# Patient Record
Sex: MALE | Race: WHITE | NOT HISPANIC OR LATINO | Employment: FULL TIME | URBAN - METROPOLITAN AREA
[De-identification: names, ages, dates, MRNs, and addresses within clinical notes are randomized per-mention and may not be internally consistent; named-entity substitution may affect disease eponyms.]

---

## 2017-01-31 ENCOUNTER — RECORDS - HEALTHEAST (OUTPATIENT)
Dept: LAB | Facility: CLINIC | Age: 57
End: 2017-01-31

## 2017-01-31 LAB
CHOLEST SERPL-MCNC: 176 MG/DL
FASTING STATUS PATIENT QL REPORTED: YES
HDLC SERPL-MCNC: 36 MG/DL
LDLC SERPL CALC-MCNC: 106 MG/DL
TRIGL SERPL-MCNC: 172 MG/DL

## 2017-07-27 ENCOUNTER — RECORDS - HEALTHEAST (OUTPATIENT)
Dept: LAB | Facility: CLINIC | Age: 57
End: 2017-07-27

## 2017-07-27 LAB
CHOLEST SERPL-MCNC: 167 MG/DL
FASTING STATUS PATIENT QL REPORTED: ABNORMAL
HDLC SERPL-MCNC: 34 MG/DL
LDLC SERPL CALC-MCNC: 89 MG/DL
TRIGL SERPL-MCNC: 218 MG/DL

## 2018-06-11 ENCOUNTER — RECORDS - HEALTHEAST (OUTPATIENT)
Dept: LAB | Facility: CLINIC | Age: 58
End: 2018-06-11

## 2018-06-11 LAB
ANION GAP SERPL CALCULATED.3IONS-SCNC: 9 MMOL/L (ref 5–18)
BUN SERPL-MCNC: 18 MG/DL (ref 8–22)
CALCIUM SERPL-MCNC: 9.4 MG/DL (ref 8.5–10.5)
CHLORIDE BLD-SCNC: 107 MMOL/L (ref 98–107)
CHOLEST SERPL-MCNC: 138 MG/DL
CO2 SERPL-SCNC: 23 MMOL/L (ref 22–31)
CREAT SERPL-MCNC: 1.41 MG/DL (ref 0.7–1.3)
CREAT UR-MCNC: 102.6 MG/DL
FASTING STATUS PATIENT QL REPORTED: ABNORMAL
GFR SERPL CREATININE-BSD FRML MDRD: 52 ML/MIN/1.73M2
GLUCOSE BLD-MCNC: 120 MG/DL (ref 70–125)
HDLC SERPL-MCNC: 33 MG/DL
LDLC SERPL CALC-MCNC: 66 MG/DL
MICROALBUMIN UR-MCNC: 16.31 MG/DL (ref 0–1.99)
MICROALBUMIN/CREAT UR: 159 MG/G
POTASSIUM BLD-SCNC: 4.5 MMOL/L (ref 3.5–5)
SODIUM SERPL-SCNC: 139 MMOL/L (ref 136–145)
TRIGL SERPL-MCNC: 195 MG/DL
URATE SERPL-MCNC: 8 MG/DL (ref 3–8)

## 2019-01-17 ENCOUNTER — RECORDS - HEALTHEAST (OUTPATIENT)
Dept: LAB | Facility: CLINIC | Age: 59
End: 2019-01-17

## 2019-01-17 LAB
ANION GAP SERPL CALCULATED.3IONS-SCNC: 12 MMOL/L (ref 5–18)
BUN SERPL-MCNC: 17 MG/DL (ref 8–22)
CALCIUM SERPL-MCNC: 9.7 MG/DL (ref 8.5–10.5)
CHLORIDE BLD-SCNC: 103 MMOL/L (ref 98–107)
CHOLEST SERPL-MCNC: 148 MG/DL
CO2 SERPL-SCNC: 22 MMOL/L (ref 22–31)
CREAT SERPL-MCNC: 1.4 MG/DL (ref 0.7–1.3)
FASTING STATUS PATIENT QL REPORTED: ABNORMAL
GFR SERPL CREATININE-BSD FRML MDRD: 52 ML/MIN/1.73M2
GLUCOSE BLD-MCNC: 223 MG/DL (ref 70–125)
HDLC SERPL-MCNC: 37 MG/DL
LDLC SERPL CALC-MCNC: 66 MG/DL
POTASSIUM BLD-SCNC: 4.4 MMOL/L (ref 3.5–5)
SODIUM SERPL-SCNC: 137 MMOL/L (ref 136–145)
TRIGL SERPL-MCNC: 223 MG/DL

## 2019-04-18 ENCOUNTER — RECORDS - HEALTHEAST (OUTPATIENT)
Dept: LAB | Facility: CLINIC | Age: 59
End: 2019-04-18

## 2019-04-18 LAB
ALBUMIN SERPL-MCNC: 4.2 G/DL (ref 3.5–5)
ALP SERPL-CCNC: 78 U/L (ref 45–120)
ALT SERPL W P-5'-P-CCNC: 24 U/L (ref 0–45)
ANION GAP SERPL CALCULATED.3IONS-SCNC: 13 MMOL/L (ref 5–18)
AST SERPL W P-5'-P-CCNC: 15 U/L (ref 0–40)
BILIRUB SERPL-MCNC: 0.4 MG/DL (ref 0–1)
BUN SERPL-MCNC: 25 MG/DL (ref 8–22)
CALCIUM SERPL-MCNC: 9.8 MG/DL (ref 8.5–10.5)
CHLORIDE BLD-SCNC: 106 MMOL/L (ref 98–107)
CO2 SERPL-SCNC: 21 MMOL/L (ref 22–31)
CREAT SERPL-MCNC: 1.32 MG/DL (ref 0.7–1.3)
GFR SERPL CREATININE-BSD FRML MDRD: 56 ML/MIN/1.73M2
GLUCOSE BLD-MCNC: 172 MG/DL (ref 70–125)
POTASSIUM BLD-SCNC: 4.8 MMOL/L (ref 3.5–5)
PROT SERPL-MCNC: 6.9 G/DL (ref 6–8)
PSA SERPL-MCNC: 0.6 NG/ML (ref 0–3.5)
SODIUM SERPL-SCNC: 140 MMOL/L (ref 136–145)

## 2019-07-29 ENCOUNTER — RECORDS - HEALTHEAST (OUTPATIENT)
Dept: LAB | Facility: CLINIC | Age: 59
End: 2019-07-29

## 2019-07-29 LAB
ALBUMIN SERPL-MCNC: 4 G/DL (ref 3.5–5)
ALP SERPL-CCNC: 84 U/L (ref 45–120)
ALT SERPL W P-5'-P-CCNC: 31 U/L (ref 0–45)
ANION GAP SERPL CALCULATED.3IONS-SCNC: 11 MMOL/L (ref 5–18)
AST SERPL W P-5'-P-CCNC: 19 U/L (ref 0–40)
BILIRUB SERPL-MCNC: 0.5 MG/DL (ref 0–1)
BUN SERPL-MCNC: 25 MG/DL (ref 8–22)
CALCIUM SERPL-MCNC: 9.7 MG/DL (ref 8.5–10.5)
CHLORIDE BLD-SCNC: 108 MMOL/L (ref 98–107)
CO2 SERPL-SCNC: 20 MMOL/L (ref 22–31)
CREAT SERPL-MCNC: 1.3 MG/DL (ref 0.7–1.3)
CREAT UR-MCNC: 57.3 MG/DL
GFR SERPL CREATININE-BSD FRML MDRD: 57 ML/MIN/1.73M2
GLUCOSE BLD-MCNC: 177 MG/DL (ref 70–125)
MICROALBUMIN UR-MCNC: 3.61 MG/DL (ref 0–1.99)
MICROALBUMIN/CREAT UR: 63 MG/G
POTASSIUM BLD-SCNC: 4.7 MMOL/L (ref 3.5–5)
PROT SERPL-MCNC: 6.8 G/DL (ref 6–8)
SODIUM SERPL-SCNC: 139 MMOL/L (ref 136–145)

## 2019-11-01 ENCOUNTER — RECORDS - HEALTHEAST (OUTPATIENT)
Dept: LAB | Facility: CLINIC | Age: 59
End: 2019-11-01

## 2019-11-01 LAB
ALBUMIN SERPL-MCNC: 3.7 G/DL (ref 3.5–5)
ALP SERPL-CCNC: 92 U/L (ref 45–120)
ALT SERPL W P-5'-P-CCNC: 29 U/L (ref 0–45)
ANION GAP SERPL CALCULATED.3IONS-SCNC: 6 MMOL/L (ref 5–18)
AST SERPL W P-5'-P-CCNC: 16 U/L (ref 0–40)
BILIRUB SERPL-MCNC: 0.4 MG/DL (ref 0–1)
BUN SERPL-MCNC: 18 MG/DL (ref 8–22)
CALCIUM SERPL-MCNC: 9.1 MG/DL (ref 8.5–10.5)
CHLORIDE BLD-SCNC: 107 MMOL/L (ref 98–107)
CHOLEST SERPL-MCNC: 136 MG/DL
CO2 SERPL-SCNC: 26 MMOL/L (ref 22–31)
CREAT SERPL-MCNC: 1.46 MG/DL (ref 0.7–1.3)
FASTING STATUS PATIENT QL REPORTED: ABNORMAL
GFR SERPL CREATININE-BSD FRML MDRD: 49 ML/MIN/1.73M2
GLUCOSE BLD-MCNC: 281 MG/DL (ref 70–125)
HDLC SERPL-MCNC: 34 MG/DL
LDLC SERPL CALC-MCNC: 61 MG/DL
POTASSIUM BLD-SCNC: 4.9 MMOL/L (ref 3.5–5)
PROT SERPL-MCNC: 6.3 G/DL (ref 6–8)
SODIUM SERPL-SCNC: 139 MMOL/L (ref 136–145)
TRIGL SERPL-MCNC: 206 MG/DL

## 2020-02-04 ENCOUNTER — RECORDS - HEALTHEAST (OUTPATIENT)
Dept: LAB | Facility: CLINIC | Age: 60
End: 2020-02-04

## 2020-02-04 LAB
ALBUMIN SERPL-MCNC: 4 G/DL (ref 3.5–5)
ALP SERPL-CCNC: 94 U/L (ref 45–120)
ALT SERPL W P-5'-P-CCNC: 29 U/L (ref 0–45)
ANION GAP SERPL CALCULATED.3IONS-SCNC: 10 MMOL/L (ref 5–18)
AST SERPL W P-5'-P-CCNC: 18 U/L (ref 0–40)
BILIRUB SERPL-MCNC: 0.5 MG/DL (ref 0–1)
BUN SERPL-MCNC: 25 MG/DL (ref 8–22)
CALCIUM SERPL-MCNC: 9.7 MG/DL (ref 8.5–10.5)
CHLORIDE BLD-SCNC: 106 MMOL/L (ref 98–107)
CO2 SERPL-SCNC: 24 MMOL/L (ref 22–31)
CREAT SERPL-MCNC: 1.48 MG/DL (ref 0.7–1.3)
GFR SERPL CREATININE-BSD FRML MDRD: 49 ML/MIN/1.73M2
GLUCOSE BLD-MCNC: 148 MG/DL (ref 70–125)
POTASSIUM BLD-SCNC: 5 MMOL/L (ref 3.5–5)
PROT SERPL-MCNC: 6.8 G/DL (ref 6–8)
SODIUM SERPL-SCNC: 140 MMOL/L (ref 136–145)

## 2020-05-11 ENCOUNTER — RECORDS - HEALTHEAST (OUTPATIENT)
Dept: LAB | Facility: CLINIC | Age: 60
End: 2020-05-11

## 2020-05-11 LAB
ALBUMIN SERPL-MCNC: 3.8 G/DL (ref 3.5–5)
ALP SERPL-CCNC: 102 U/L (ref 45–120)
ALT SERPL W P-5'-P-CCNC: 43 U/L (ref 0–45)
ANION GAP SERPL CALCULATED.3IONS-SCNC: 10 MMOL/L (ref 5–18)
AST SERPL W P-5'-P-CCNC: 23 U/L (ref 0–40)
BILIRUB SERPL-MCNC: 0.3 MG/DL (ref 0–1)
BUN SERPL-MCNC: 23 MG/DL (ref 8–22)
CALCIUM SERPL-MCNC: 9.8 MG/DL (ref 8.5–10.5)
CHLORIDE BLD-SCNC: 104 MMOL/L (ref 98–107)
CHOLEST SERPL-MCNC: 158 MG/DL
CO2 SERPL-SCNC: 26 MMOL/L (ref 22–31)
CREAT SERPL-MCNC: 1.5 MG/DL (ref 0.7–1.3)
FASTING STATUS PATIENT QL REPORTED: ABNORMAL
GFR SERPL CREATININE-BSD FRML MDRD: 48 ML/MIN/1.73M2
GLUCOSE BLD-MCNC: 301 MG/DL (ref 70–125)
HDLC SERPL-MCNC: 41 MG/DL
LDLC SERPL CALC-MCNC: 66 MG/DL
POTASSIUM BLD-SCNC: 4.8 MMOL/L (ref 3.5–5)
PROT SERPL-MCNC: 6.8 G/DL (ref 6–8)
SODIUM SERPL-SCNC: 140 MMOL/L (ref 136–145)
TRIGL SERPL-MCNC: 255 MG/DL

## 2020-08-13 ENCOUNTER — RECORDS - HEALTHEAST (OUTPATIENT)
Dept: LAB | Facility: CLINIC | Age: 60
End: 2020-08-13

## 2020-08-13 LAB
ALBUMIN SERPL-MCNC: 3.9 G/DL (ref 3.5–5)
ALP SERPL-CCNC: 90 U/L (ref 45–120)
ALT SERPL W P-5'-P-CCNC: 34 U/L (ref 0–45)
ANION GAP SERPL CALCULATED.3IONS-SCNC: 10 MMOL/L (ref 5–18)
AST SERPL W P-5'-P-CCNC: 18 U/L (ref 0–40)
BILIRUB SERPL-MCNC: 0.3 MG/DL (ref 0–1)
BUN SERPL-MCNC: 17 MG/DL (ref 8–22)
CALCIUM SERPL-MCNC: 9.7 MG/DL (ref 8.5–10.5)
CHLORIDE BLD-SCNC: 106 MMOL/L (ref 98–107)
CO2 SERPL-SCNC: 24 MMOL/L (ref 22–31)
CREAT SERPL-MCNC: 1.36 MG/DL (ref 0.7–1.3)
GFR SERPL CREATININE-BSD FRML MDRD: 54 ML/MIN/1.73M2
GLUCOSE BLD-MCNC: 140 MG/DL (ref 70–125)
MAGNESIUM SERPL-MCNC: 2.2 MG/DL (ref 1.8–2.6)
POTASSIUM BLD-SCNC: 4.9 MMOL/L (ref 3.5–5)
PROT SERPL-MCNC: 6.6 G/DL (ref 6–8)
SODIUM SERPL-SCNC: 140 MMOL/L (ref 136–145)

## 2020-11-16 ENCOUNTER — RECORDS - HEALTHEAST (OUTPATIENT)
Dept: LAB | Facility: CLINIC | Age: 60
End: 2020-11-16

## 2020-11-16 LAB
CHOLEST SERPL-MCNC: 129 MG/DL
CREAT UR-MCNC: 62.9 MG/DL
FASTING STATUS PATIENT QL REPORTED: ABNORMAL
HDLC SERPL-MCNC: 35 MG/DL
LDLC SERPL CALC-MCNC: 56 MG/DL
MICROALBUMIN UR-MCNC: 4.48 MG/DL (ref 0–1.99)
MICROALBUMIN/CREAT UR: 71.2 MG/G
TRIGL SERPL-MCNC: 190 MG/DL

## 2021-04-02 ENCOUNTER — RECORDS - HEALTHEAST (OUTPATIENT)
Dept: ADMINISTRATIVE | Facility: OTHER | Age: 61
End: 2021-04-02

## 2021-04-02 ENCOUNTER — RECORDS - HEALTHEAST (OUTPATIENT)
Dept: LAB | Facility: CLINIC | Age: 61
End: 2021-04-02

## 2021-04-02 LAB
ALBUMIN SERPL-MCNC: 4 G/DL (ref 3.5–5)
ALP SERPL-CCNC: 90 U/L (ref 45–120)
ALT SERPL W P-5'-P-CCNC: 34 U/L (ref 0–45)
ANION GAP SERPL CALCULATED.3IONS-SCNC: 11 MMOL/L (ref 5–18)
AST SERPL W P-5'-P-CCNC: 16 U/L (ref 0–40)
BILIRUB SERPL-MCNC: 0.6 MG/DL (ref 0–1)
BUN SERPL-MCNC: 24 MG/DL (ref 8–22)
CALCIUM SERPL-MCNC: 9.1 MG/DL (ref 8.5–10.5)
CHLORIDE BLD-SCNC: 107 MMOL/L (ref 98–107)
CHOLEST SERPL-MCNC: 132 MG/DL
CO2 SERPL-SCNC: 23 MMOL/L (ref 22–31)
CREAT SERPL-MCNC: 1.34 MG/DL (ref 0.7–1.3)
FASTING STATUS PATIENT QL REPORTED: ABNORMAL
GFR SERPL CREATININE-BSD FRML MDRD: 54 ML/MIN/1.73M2
GLUCOSE BLD-MCNC: 126 MG/DL (ref 70–125)
HDLC SERPL-MCNC: 37 MG/DL
LDLC SERPL CALC-MCNC: 68 MG/DL
POTASSIUM BLD-SCNC: 4.6 MMOL/L (ref 3.5–5)
PROT SERPL-MCNC: 6.7 G/DL (ref 6–8)
SODIUM SERPL-SCNC: 141 MMOL/L (ref 136–145)
TRIGL SERPL-MCNC: 137 MG/DL

## 2021-04-05 ENCOUNTER — OFFICE VISIT - HEALTHEAST (OUTPATIENT)
Dept: CARDIOLOGY | Facility: CLINIC | Age: 61
End: 2021-04-05

## 2021-04-05 ENCOUNTER — AMBULATORY - HEALTHEAST (OUTPATIENT)
Dept: CARDIOLOGY | Facility: CLINIC | Age: 61
End: 2021-04-05

## 2021-04-05 ENCOUNTER — SURGERY - HEALTHEAST (OUTPATIENT)
Dept: CARDIOLOGY | Facility: CLINIC | Age: 61
End: 2021-04-05

## 2021-04-05 ENCOUNTER — AMBULATORY - HEALTHEAST (OUTPATIENT)
Dept: LAB | Facility: CLINIC | Age: 61
End: 2021-04-05

## 2021-04-05 DIAGNOSIS — E66.09 OBESITY DUE TO EXCESS CALORIES: ICD-10-CM

## 2021-04-05 DIAGNOSIS — I25.10 CORONARY ARTERY DISEASE DUE TO LIPID RICH PLAQUE: ICD-10-CM

## 2021-04-05 DIAGNOSIS — I25.83 CORONARY ARTERY DISEASE DUE TO LIPID RICH PLAQUE: ICD-10-CM

## 2021-04-05 DIAGNOSIS — G47.33 OSA (OBSTRUCTIVE SLEEP APNEA): ICD-10-CM

## 2021-04-05 DIAGNOSIS — Z72.0 TOBACCO ABUSE DISORDER: ICD-10-CM

## 2021-04-05 DIAGNOSIS — Z01.812 PRE-PROCEDURE LAB EXAM: ICD-10-CM

## 2021-04-05 DIAGNOSIS — E11.9 TYPE 2 DIABETES MELLITUS, WITHOUT LONG-TERM CURRENT USE OF INSULIN (H): ICD-10-CM

## 2021-04-05 DIAGNOSIS — I10 ESSENTIAL HYPERTENSION: ICD-10-CM

## 2021-04-05 ASSESSMENT — MIFFLIN-ST. JEOR: SCORE: 1956.47

## 2021-04-06 LAB
SARS-COV-2 PCR COMMENT: NORMAL
SARS-COV-2 RNA SPEC QL NAA+PROBE: NEGATIVE
SARS-COV-2 VIRUS SPECIMEN SOURCE: NORMAL

## 2021-04-07 ENCOUNTER — COMMUNICATION - HEALTHEAST (OUTPATIENT)
Dept: SCHEDULING | Facility: CLINIC | Age: 61
End: 2021-04-07

## 2021-04-08 ENCOUNTER — SURGERY - HEALTHEAST (OUTPATIENT)
Dept: CARDIOLOGY | Facility: HOSPITAL | Age: 61
End: 2021-04-08

## 2021-04-08 ASSESSMENT — MIFFLIN-ST. JEOR: SCORE: 1947.98

## 2021-04-12 ENCOUNTER — RECORDS - HEALTHEAST (OUTPATIENT)
Dept: LAB | Facility: CLINIC | Age: 61
End: 2021-04-12

## 2021-04-12 LAB
ANION GAP SERPL CALCULATED.3IONS-SCNC: 8 MMOL/L (ref 5–18)
BUN SERPL-MCNC: 33 MG/DL (ref 8–22)
CALCIUM SERPL-MCNC: 9.1 MG/DL (ref 8.5–10.5)
CHLORIDE BLD-SCNC: 108 MMOL/L (ref 98–107)
CO2 SERPL-SCNC: 25 MMOL/L (ref 22–31)
CREAT SERPL-MCNC: 1.53 MG/DL (ref 0.7–1.3)
GFR SERPL CREATININE-BSD FRML MDRD: 47 ML/MIN/1.73M2
GLUCOSE BLD-MCNC: 117 MG/DL (ref 70–125)
POTASSIUM BLD-SCNC: 4.8 MMOL/L (ref 3.5–5)
SODIUM SERPL-SCNC: 141 MMOL/L (ref 136–145)

## 2021-04-20 ENCOUNTER — OFFICE VISIT - HEALTHEAST (OUTPATIENT)
Dept: CARDIOLOGY | Facility: CLINIC | Age: 61
End: 2021-04-20

## 2021-04-20 DIAGNOSIS — E66.811 CLASS 1 OBESITY DUE TO EXCESS CALORIES WITH BODY MASS INDEX (BMI) OF 34.0 TO 34.9 IN ADULT, UNSPECIFIED WHETHER SERIOUS COMORBIDITY PRESENT: ICD-10-CM

## 2021-04-20 DIAGNOSIS — I25.83 CORONARY ARTERY DISEASE DUE TO LIPID RICH PLAQUE: ICD-10-CM

## 2021-04-20 DIAGNOSIS — I25.10 CORONARY ARTERY DISEASE DUE TO LIPID RICH PLAQUE: ICD-10-CM

## 2021-04-20 DIAGNOSIS — E66.09 CLASS 1 OBESITY DUE TO EXCESS CALORIES WITH BODY MASS INDEX (BMI) OF 34.0 TO 34.9 IN ADULT, UNSPECIFIED WHETHER SERIOUS COMORBIDITY PRESENT: ICD-10-CM

## 2021-04-20 DIAGNOSIS — I10 ESSENTIAL HYPERTENSION: ICD-10-CM

## 2021-04-20 DIAGNOSIS — Z72.0 TOBACCO ABUSE DISORDER: ICD-10-CM

## 2021-04-20 ASSESSMENT — MIFFLIN-ST. JEOR: SCORE: 1943.44

## 2021-05-19 ENCOUNTER — OFFICE VISIT - HEALTHEAST (OUTPATIENT)
Dept: CARDIOLOGY | Facility: CLINIC | Age: 61
End: 2021-05-19

## 2021-05-19 DIAGNOSIS — Z72.0 TOBACCO ABUSE DISORDER: ICD-10-CM

## 2021-05-19 DIAGNOSIS — E66.811 CLASS 1 OBESITY DUE TO EXCESS CALORIES WITH BODY MASS INDEX (BMI) OF 34.0 TO 34.9 IN ADULT, UNSPECIFIED WHETHER SERIOUS COMORBIDITY PRESENT: ICD-10-CM

## 2021-05-19 DIAGNOSIS — E78.00 PURE HYPERCHOLESTEROLEMIA: ICD-10-CM

## 2021-05-19 DIAGNOSIS — E66.09 CLASS 1 OBESITY DUE TO EXCESS CALORIES WITH BODY MASS INDEX (BMI) OF 34.0 TO 34.9 IN ADULT, UNSPECIFIED WHETHER SERIOUS COMORBIDITY PRESENT: ICD-10-CM

## 2021-05-19 DIAGNOSIS — I25.83 CORONARY ARTERY DISEASE DUE TO LIPID RICH PLAQUE: ICD-10-CM

## 2021-05-19 DIAGNOSIS — I25.10 CORONARY ARTERY DISEASE DUE TO LIPID RICH PLAQUE: ICD-10-CM

## 2021-05-19 DIAGNOSIS — I10 ESSENTIAL HYPERTENSION: ICD-10-CM

## 2021-05-19 DIAGNOSIS — G47.33 OSA (OBSTRUCTIVE SLEEP APNEA): ICD-10-CM

## 2021-05-19 DIAGNOSIS — E11.9 TYPE 2 DIABETES MELLITUS, WITHOUT LONG-TERM CURRENT USE OF INSULIN (H): ICD-10-CM

## 2021-05-19 ASSESSMENT — MIFFLIN-ST. JEOR: SCORE: 1929.83

## 2021-05-27 VITALS
WEIGHT: 242 LBS | RESPIRATION RATE: 14 BRPM | HEART RATE: 71 BPM | HEIGHT: 71 IN | DIASTOLIC BLOOD PRESSURE: 50 MMHG | SYSTOLIC BLOOD PRESSURE: 98 MMHG | BODY MASS INDEX: 33.88 KG/M2

## 2021-06-05 VITALS
SYSTOLIC BLOOD PRESSURE: 94 MMHG | BODY MASS INDEX: 34.58 KG/M2 | OXYGEN SATURATION: 94 % | RESPIRATION RATE: 16 BRPM | HEIGHT: 71 IN | DIASTOLIC BLOOD PRESSURE: 54 MMHG | HEART RATE: 83 BPM | WEIGHT: 247 LBS

## 2021-06-05 VITALS
BODY MASS INDEX: 34.3 KG/M2 | HEART RATE: 72 BPM | SYSTOLIC BLOOD PRESSURE: 138 MMHG | RESPIRATION RATE: 16 BRPM | DIASTOLIC BLOOD PRESSURE: 60 MMHG | HEIGHT: 71 IN | WEIGHT: 245 LBS

## 2021-06-05 VITALS — BODY MASS INDEX: 34.44 KG/M2 | WEIGHT: 246 LBS | HEIGHT: 71 IN

## 2021-06-08 ENCOUNTER — HOSPITAL ENCOUNTER (OUTPATIENT)
Dept: NUCLEAR MEDICINE | Facility: HOSPITAL | Age: 61
Discharge: HOME | End: 2021-06-08
Attending: INTERNAL MEDICINE
Payer: COMMERCIAL

## 2021-06-08 ENCOUNTER — HOSPITAL ENCOUNTER (OUTPATIENT)
Dept: CARDIOLOGY | Facility: HOSPITAL | Age: 61
Discharge: HOME | End: 2021-06-08
Attending: INTERNAL MEDICINE
Payer: COMMERCIAL

## 2021-06-08 DIAGNOSIS — E78.00 PURE HYPERCHOLESTEROLEMIA: ICD-10-CM

## 2021-06-08 DIAGNOSIS — I25.83 CORONARY ARTERY DISEASE DUE TO LIPID RICH PLAQUE: ICD-10-CM

## 2021-06-08 DIAGNOSIS — I25.10 CORONARY ARTERY DISEASE DUE TO LIPID RICH PLAQUE: ICD-10-CM

## 2021-06-08 LAB
CV STRESS CURRENT BP HE: NORMAL
CV STRESS CURRENT HR HE: 66
CV STRESS CURRENT HR HE: 68
CV STRESS CURRENT HR HE: 69
CV STRESS CURRENT HR HE: 75
CV STRESS CURRENT HR HE: 77
CV STRESS CURRENT HR HE: 78
CV STRESS CURRENT HR HE: 80
CV STRESS CURRENT HR HE: 80
CV STRESS CURRENT HR HE: 82
CV STRESS CURRENT HR HE: 83
CV STRESS CURRENT HR HE: 83
CV STRESS CURRENT HR HE: 85
CV STRESS CURRENT HR HE: 86
CV STRESS CURRENT HR HE: 88
CV STRESS CURRENT HR HE: 89
CV STRESS CURRENT HR HE: 89
CV STRESS CURRENT HR HE: 90
CV STRESS CURRENT HR HE: 90
CV STRESS CURRENT HR HE: 91
CV STRESS CURRENT HR HE: 92
CV STRESS DEVIATION TIME HE: NORMAL
CV STRESS ECHO PERCENT HR HE: NORMAL
CV STRESS EXERCISE STAGE HE: NORMAL
CV STRESS FINAL RESTING BP HE: NORMAL
CV STRESS FINAL RESTING HR HE: 77
CV STRESS MAX HR HE: 92
CV STRESS MAX TREADMILL GRADE HE: 0
CV STRESS MAX TREADMILL SPEED HE: 0
CV STRESS PEAK DIA BP HE: NORMAL
CV STRESS PEAK SYS BP HE: NORMAL
CV STRESS PHASE HE: NORMAL
CV STRESS PROTOCOL HE: NORMAL
CV STRESS RESTING PT POSITION HE: NORMAL
CV STRESS RESTING PT POSITION HE: NORMAL
CV STRESS ST DEVIATION AMOUNT HE: NORMAL
CV STRESS ST DEVIATION ELEVATION HE: NORMAL
CV STRESS ST EVELATION AMOUNT HE: NORMAL
CV STRESS TEST TYPE HE: NORMAL
CV STRESS TOTAL STAGE TIME MIN 1 HE: NORMAL
NUC STRESS EJECTION FRACTION: 58 %
RATE PRESSURE PRODUCT: NORMAL
STRESS ECHO BASELINE DIASTOLIC HE: 70
STRESS ECHO BASELINE HR: 67
STRESS ECHO BASELINE SYSTOLIC BP: 98
STRESS ECHO CALCULATED PERCENT HR: 58 %
STRESS ECHO LAST STRESS DIASTOLIC BP: 81
STRESS ECHO LAST STRESS HR: 85
STRESS ECHO LAST STRESS SYSTOLIC BP: 145
STRESS ECHO TARGET HR: 160

## 2021-06-10 ENCOUNTER — AMBULATORY - HEALTHEAST (OUTPATIENT)
Dept: CARDIOLOGY | Facility: CLINIC | Age: 61
End: 2021-06-10

## 2021-06-10 DIAGNOSIS — I25.83 CORONARY ARTERY DISEASE DUE TO LIPID RICH PLAQUE: ICD-10-CM

## 2021-06-10 DIAGNOSIS — I25.10 CORONARY ARTERY DISEASE DUE TO LIPID RICH PLAQUE: ICD-10-CM

## 2021-06-16 PROBLEM — I25.10 CORONARY ARTERY DISEASE DUE TO LIPID RICH PLAQUE: Status: ACTIVE | Noted: 2021-04-05

## 2021-06-16 PROBLEM — E78.00 PURE HYPERCHOLESTEROLEMIA: Status: ACTIVE | Noted: 2021-05-19

## 2021-06-16 PROBLEM — I10 ESSENTIAL HYPERTENSION: Status: ACTIVE | Noted: 2021-04-05

## 2021-06-16 PROBLEM — E66.09 OBESITY DUE TO EXCESS CALORIES: Status: ACTIVE | Noted: 2021-04-05

## 2021-06-16 PROBLEM — E11.9 TYPE 2 DIABETES MELLITUS, WITHOUT LONG-TERM CURRENT USE OF INSULIN (H): Status: ACTIVE | Noted: 2021-04-05

## 2021-06-16 PROBLEM — Z72.0 TOBACCO ABUSE DISORDER: Status: ACTIVE | Noted: 2021-04-05

## 2021-06-16 PROBLEM — I25.83 CORONARY ARTERY DISEASE DUE TO LIPID RICH PLAQUE: Status: ACTIVE | Noted: 2021-04-05

## 2021-06-16 PROBLEM — G47.33 OSA (OBSTRUCTIVE SLEEP APNEA): Status: ACTIVE | Noted: 2021-04-05

## 2021-06-16 NOTE — PROGRESS NOTES
Rufus Art  [address] [facility] [address]  [phone] (home)     Primary cardiologist: ALBINA   PCP:  Momo Unger MD  Procedure:  Cor angio poss pci  H&P completed by:  4/5/2021  Case MD:  Chana  Admit date and time:  4/8  Case start time: 0730  Ordering MD:  ALBINA  Diagnosis: chest pain  Anticoagulation: None  CPAP: Yes  Bypass Grafts: No  Renal Issues: No  Allergies: NKA  Diabetic?: Yes  Device?: No      Angiogram Teaching    Reason for Visit:  Telephone call to discuss pre-procedure education in preparation for: 4/5/2021    Procedure Prep:  EKG results obtained, dated: on admission  Pertinent test results obtained - Viewable in [facility], dated: Carmen August 8/2017 see [facility] results  Hemogram results obtained: on admission  Basic Metabolic Panel results obtained: on admission  Lipid Profile results obtained: on admission      Pre-procedure instructions  Patient instructed to be NPO after midnight.  Patient instructed to arrange for transportation home following procedure.  Patient instructed to have a responsible adult with them for 24 hours post-procedure.  Post-procedure follow up process.  Conscious sedation discussed.  The patient was sent the pre-procedure letter (If requested) on - given 4/5    Pre-procedure medication instructions  Patient instructed on antiplatelet medication.  Continue medications as scheduled, with a small amount of water on the day of the procedure unless indicated.  Patient instructed to take 325 mg of Aspirin am of procedure: Yes  Other medication: instructed to take 325 mg aspirin, metoprolol, lisinopril, celexa, wellbutrin the morning of the procedure with sips of water. Advised to hold metformin and glipizide the morning of the procedure.     *PATIENTS RECORDS AVAILABLE IN [facility] UNLESS OTHERWISE INDICATED*    *Order set was entered on this date: 4/5/2021    Patient Active Problem List   Diagnosis     Coronary artery disease due to  lipid rich plaque     SARA (obstructive sleep apnea)     Type 2 diabetes mellitus, without long-term current use of insulin (H)     Essential hypertension     Tobacco abuse disorder     Obesity due to excess calories       Current Outpatient Medications   Medication Sig Dispense Refill     acetaminophen (TYLENOL) 500 MG tablet Take 500-1,000 mg by mouth as needed.       aspirin 81 MG EC tablet Take 81 mg by mouth as needed.       buPROPion (WELLBUTRIN XL) 150 MG 24 hr tablet Take 1 tablet by mouth daily.       citalopram (CELEXA) 20 MG tablet Take 30 mg by mouth daily.       dapagliflozin (FARXIGA) 10 mg Tab Take 1 tablet by mouth daily.       dulaglutide (TRULICITY) 1.5 mg/0.5 mL PnIj Inject 1.5 mg under the skin once a week.       gabapentin (NEURONTIN) 300 MG capsule Take 600 mg by mouth 3 (three) times a day.       glipiZIDE (GLUCOTROL) 5 MG tablet Take 5 mg by mouth daily before breakfast.       lisinopriL (PRINIVIL,ZESTRIL) 5 MG tablet Take 5 mg by mouth daily.       LORazepam (ATIVAN) 0.5 MG tablet Take 0.5-1 mg by mouth as needed.       metFORMIN (GLUCOPHAGE) 1000 MG tablet Take 1,000 mg by mouth 2 (two) times a day.       metoprolol tartrate (LOPRESSOR) 25 mg Take 25 mg by mouth 2 (two) times a day.       nitroglycerin (NITROSTAT) 0.4 MG SL tablet see administration instructions.       simvastatin (ZOCOR) 40 MG tablet Take 20 mg by mouth at bedtime.       No current facility-administered medications for this visit.        No Known Allergies    Plan  Pt's wife will be   Patient ready for procedure    WANDA Salvador

## 2021-06-17 NOTE — PATIENT INSTRUCTIONS - HE
Rufus GARY Art,  I enjoyed visiting with you again today.  I am glad to hear you are doing well.  Per our conversation try to stop smoking and let us get a stress test around June.  I will plan on seeing you October.  Kory Nails

## 2021-06-27 ENCOUNTER — HEALTH MAINTENANCE LETTER (OUTPATIENT)
Age: 61
End: 2021-06-27

## 2021-06-30 NOTE — PROGRESS NOTES
Progress Notes by Sherry Bell CNP at 4/20/2021  7:50 AM     Author: Sherry Bell CNP Service: -- Author Type: Nurse Practitioner    Filed: 4/20/2021 10:52 AM Encounter Date: 4/20/2021 Status: Signed    : Sherry Bell CNP (Nurse Practitioner)             Assessment/Recommendations   1.  Coronary artery disease: History of non-STEMI in 2007 with RCA stent. He had increasing chest discomfort with nausea, shortness of breath, and diaphoresis.  PCI on April 8, 2021 with drug-eluting stent to proximal LAD.  Dual antiplatelet therapy is being used with aspirin indefinitely and ticagrelor for 1 year.    Risk factor modification and lifestyle management topics were discussed including managing comorbidities, weight loss, heart healthy diet, exercise and smoking cessation.  He is not interested in cardiac rehab.  We discussed options for an exercise routine at home.  He has a treadmill and stationary bike.  He states that neuropathy limits his activity.  He does have a pool at his home but does not know how to swim.    2.  Dyslipidemia: Rufus Art is on high intensity statin therapy with atorvastatin 80 mg daily.  His LDL was 69.  We discussed a diet low in saturated fat, weight loss, and exercise along with medication for better control of cholesterol.     3.  Hypertension: His blood pressure is 138/60.    4.  Diabetes:  We discussed the importance of tightly controlled blood sugars to minimize risk of worsening coronary artery disease.  Primary care provider to manage diabetes.    5.  Tobacco use: He continues to smoke 8 cigarettes daily and is not interested in quitting.  He is aware of the importance of quitting.    6.  Obesity: He is working on weight loss through diet and exercise.    Eduardo will follow up with Dr. Nails on May 19.       History of Present Illness/Subjective    Rufus Art is seen at [facility] for post coronary intervention follow up.   He has a history of non-STEMI with RCA stent in 2007, obstructive sleep apnea, diabetes, hypertension, tobacco use, obesity, and dyslipidemia.  He had increasing chest discomfort with nausea, shortness of breath, and diaphoresis.  PCI on April 8, 2021 with drug-eluting stent to proximal LAD.  Dual antiplatelet therapy is being used with aspirin indefinitely and ticagrelor for 1 year.      He denies any symptoms since PCI.  He has significant neuropathy which limits his exercise.  He denies lightheadedness, shortness of breath, dyspnea on exertion, chest pain and lower extremity edema.      He smokes 8 cigarettes daily and has no interest in quitting.    Results for orders placed during the hospital encounter of 04/08/21   Cardiac Catheterization [CATH01] 04/08/2021    Narrative   Angina in a morbidly obese diabetic smoker with previous RCA PCI.    Normal left main    Severe proximal LAD disease, stented with a Synergy YANE with good   angiographic results. The first diagonal is a branching vessel with the   more distal branch having a severe proximal stenosis. Due to vessel   overlap and inability to get sufficient camera angles in the mobile cath   lab the size of the vessel distal to the stenosis could not be clearly   seen, but I suspect it is a small branch.    Moderate OM-3 disease.    Mild RCA instent restenosis and moderate PDA disease.    Estimated blood loss was <20 ml.           Physical Examination Review of Systems   Vitals:    04/20/21 0745   BP: 138/60   Pulse: 72   Resp: 16     Body mass index is 34.17 kg/m .  Wt Readings from Last 3 Encounters:   04/20/21 (!) 245 lb (111.1 kg)   04/08/21 (!) 246 lb (111.6 kg)   04/05/21 (!) 247 lb (112 kg)       General Appearance:     Alert, cooperative and in no acute distress.   ENT/Mouth: membranes moist, no oral lesions or bleeding gums.      EYES:  no scleral icterus, normal conjunctivae   Chest/Lungs:   lungs are clear to auscultation, no rales or wheezing,  respirations unlabored   Cardiovascular:   Regular. Normal first and second heart sounds, no edema bilateral lower extremities    Abdomen:  Soft, nontender, nondistended, bowel sounds present   Extremities: no cyanosis or clubbing   Skin:  Neurologic: warm, dry.  mood and affect are appropriate, alert and oriented x3     Puncture Site: Right radial site is soft with no bruising.  Radial pulses and Pedal pulses intact and symmetrical.  CMS intact.        General: WNL  Eyes: WNL  Ears/Nose/Throat: WNL  Lungs: Cough, Shortness of Breath  Heart: WNL  Stomach: WNL  Bladder: WNL  Muscle/Joints: WNL  Skin: WNL  Nervous System: WNL  Mental Health: WNL     Blood: WNL     Medical History  Surgical History Family History Social History   Past Medical History:   Diagnosis Date   ? CAD (coronary artery disease)    ? Chronic kidney disease    ? Diabetes mellitus (H)    ? HTN (hypertension)    ? Hypercholesterolemia    ? Obesity    ? SARA (obstructive sleep apnea)    ? Tobacco abuse     Past Surgical History:   Procedure Laterality Date   ? CORONARY ANGIOPLASTY WITH STENT PLACEMENT  2007   ? CV CORONARY ANGIOGRAM N/A 4/8/2021    Procedure: Coronary Angiogram;  Surgeon: Carolyn Harvey MD;  Location: [facility] Cardiac Cath Lab;  Service: Cardiology   ? CV LEFT HEART CATHETERIZATION WO LEFT VETRICULOGRAM Left 4/8/2021    Procedure: Left Heart Catheterization Without Left Ventriculogram;  Surgeon: Carolyn Harvey MD;  Location: [facility] Cardiac Cath Lab;  Service: Cardiology    No family history on file. Social History     Socioeconomic History   ? Marital status:      Spouse name: Not on file   ? Number of children: Not on file   ? Years of education: Not on file   ? Highest education level: Not on file   Occupational History   ? Not on file   Social Needs   ? Financial resource strain: Not on file   ? Food insecurity     Worry: Not on file     Inability: Not on file   ? Transportation needs     Medical: Not on file      Non-medical: Not on file   Tobacco Use   ? Smoking status: Current Every Day Smoker     Types: Cigarettes   ? Smokeless tobacco: Never Used   Substance and Sexual Activity   ? Alcohol use: Yes     Comment: pt reports rare use   ? Drug use: Never   ? Sexual activity: Yes   Lifestyle   ? Physical activity     Days per week: Not on file     Minutes per session: Not on file   ? Stress: Not on file   Relationships   ? Social connections     Talks on phone: Not on file     Gets together: Not on file     Attends Baptism service: Not on file     Active member of club or organization: Not on file     Attends meetings of clubs or organizations: Not on file     Relationship status: Not on file   ? Intimate partner violence     Fear of current or ex partner: Not on file     Emotionally abused: Not on file     Physically abused: Not on file     Forced sexual activity: Not on file   Other Topics Concern   ? Not on file   Social History Narrative   ? Not on file          Medications  Allergies   Current Outpatient Medications   Medication Sig Dispense Refill   ? acetaminophen (TYLENOL) 500 MG tablet Take 500-1,000 mg by mouth as needed.     ? aspirin 81 MG EC tablet Take 81 mg by mouth as needed.     ? atorvastatin (LIPITOR) 80 MG tablet Take 1 tablet (80 mg total) by mouth daily. 90 tablet 3   ? buPROPion (WELLBUTRIN XL) 150 MG 24 hr tablet Take 1 tablet by mouth daily.     ? citalopram (CELEXA) 20 MG tablet Take 30 mg by mouth daily.     ? dapagliflozin (FARXIGA) 10 mg Tab Take 1 tablet by mouth daily.     ? dulaglutide (TRULICITY) 1.5 mg/0.5 mL PnIj Inject 1.5 mg under the skin once a week.     ? gabapentin (NEURONTIN) 300 MG capsule Take 600 mg by mouth 3 (three) times a day.     ? glipiZIDE (GLUCOTROL) 5 MG tablet Take 5 mg by mouth daily before breakfast.     ? lisinopriL (PRINIVIL,ZESTRIL) 5 MG tablet Take 5 mg by mouth daily.     ? metFORMIN (GLUCOPHAGE) 1000 MG tablet Take 1,000 mg by mouth 2 (two) times a day.     ?  metoprolol tartrate (LOPRESSOR) 25 mg Take 25 mg by mouth 2 (two) times a day.     ? nitroglycerin (NITROSTAT) 0.4 MG SL tablet Place 1 tablet (0.4 mg total) under the tongue every 5 (five) minutes as needed for chest pain. May repeat every 5 minutes for a maximum of 3 doses in 15 minutes. 25 tablet 3   ? ticagrelor (BRILINTA) 90 mg Tab Take 1 tablet (90 mg total) by mouth 2 (two) times a day. Dose to start this evening. 180 tablet 3   ? LORazepam (ATIVAN) 0.5 MG tablet Take 0.5-1 mg by mouth as needed.     ? nitroglycerin (NITROSTAT) 0.4 MG SL tablet see administration instructions.       No current facility-administered medications for this visit.     No Known Allergies      Lab Results    Chemistry/lipid CBC Cardiac Enzymes/BNP/TSH/INR   Lab Results   Component Value Date    CHOL 126 04/08/2021    HDL 35 (L) 04/08/2021    LDLCALC 69 04/08/2021    TRIG 112 04/08/2021    CREATININE 1.53 (H) 04/12/2021    BUN 33 (H) 04/12/2021    K 4.8 04/12/2021     04/12/2021     (H) 04/12/2021    CO2 25 04/12/2021    Lab Results   Component Value Date    WBC 7.9 04/08/2021    HGB 16.3 04/08/2021    HCT 49.9 04/08/2021    MCV 95 04/08/2021     04/08/2021    No results found for: CKTOTAL, CKMB, CKMBINDEX, TROPONINI, BNP, TSH, INR     40 minutes spent on the date of encounter doing chart review, review of test results, patient visit and documentation.

## 2021-06-30 NOTE — PROGRESS NOTES
Progress Notes by Susu Nails MD at 5/19/2021  7:50 AM     Author: Susu Nails MD Service: -- Author Type: Physician    Filed: 5/19/2021  8:17 AM Encounter Date: 5/19/2021 Status: Signed    : Susu Nails MD (Physician)           [facility]  [facility] Follow-up Note    Assessment & Plan        1. Coronary artery disease due to lipid rich plaque-recent angiography due to symptoms April 8, 2021 showed normal left main, proximal LAD 90% lesion that received a drug-coated stent, first diagonal 70% lesion.  Circumflex was normal with third obtuse marginal artery having sequential 60 and 40% lesions, right coronary had a proximal 20% lesion with a distal PDA 50% stenosis.  Having shortness of breath, most likely secondary to ticagrelor, however we will go ahead and check stress nuclear looking for ischemia and if present to further intervention.   2. Essential hypertension-under good control on lisinopril.   3. Type 2 diabetes mellitus, without long-term current use of insulin (H)-on Farxiga, Trulicity, Metformin, glipizide with hemoglobin A1c of 6.6.   4. Tobacco abuse disorder-discussed stopping smoking.   5. SARA (obstructive sleep apnea)-nightly CPAP.   6. Class 1 obesity due to excess calories with body mass index (BMI) of 34.0 to 34.9 in adult, unspecified whether serious comorbidity present-work on weight loss.   7. Pure hypercholesterolemia-cholesterol 126 with LDL of 69 from April which is excellent.     Plan  1.  Weight loss.  2.  Stop smoking.  3.  If shortness of breath persists consider discontinue ticagrelor over to Plavix.  4.  Follow-up with me in October or sooner if shortness of breath worsens or stress test is abnormal in which case we will need repeat angiography.    Subjective  CC: 60-year-old white gentleman here for a post hospital discharge visit.  Since his intervention he does note some shortness of breath at rest most likely secondary to ticagrelor.  He  "does not have any significant chest discomfort, palpitations, PND, orthopnea, syncope, dizziness or peripheral edema.  He is not doing cardiac rehab but working out at home on the elliptical  as well as treadmill.    Medications  Current Outpatient Medications   Medication Sig   ? acetaminophen (TYLENOL) 500 MG tablet Take 500-1,000 mg by mouth as needed.   ? aspirin 81 MG EC tablet Take 81 mg by mouth as needed.   ? atorvastatin (LIPITOR) 80 MG tablet Take 1 tablet (80 mg total) by mouth daily.   ? buPROPion (WELLBUTRIN XL) 150 MG 24 hr tablet Take 1 tablet by mouth daily.   ? citalopram (CELEXA) 20 MG tablet Take 30 mg by mouth daily.   ? dapagliflozin (FARXIGA) 10 mg Tab Take 1 tablet by mouth daily.   ? dulaglutide (TRULICITY) 1.5 mg/0.5 mL PnIj Inject 1.5 mg under the skin once a week.   ? gabapentin (NEURONTIN) 300 MG capsule Take 600 mg by mouth 3 (three) times a day.   ? glipiZIDE (GLUCOTROL) 5 MG tablet Take 5 mg by mouth daily before breakfast.   ? lisinopriL (PRINIVIL,ZESTRIL) 5 MG tablet Take 5 mg by mouth daily.   ? LORazepam (ATIVAN) 0.5 MG tablet Take 0.5-1 mg by mouth as needed.   ? metFORMIN (GLUCOPHAGE) 1000 MG tablet Take 1,000 mg by mouth 2 (two) times a day.   ? metoprolol tartrate (LOPRESSOR) 25 mg Take 25 mg by mouth 2 (two) times a day.   ? nitroglycerin (NITROSTAT) 0.4 MG SL tablet Place 1 tablet (0.4 mg total) under the tongue every 5 (five) minutes as needed for chest pain. May repeat every 5 minutes for a maximum of 3 doses in 15 minutes.   ? ticagrelor (BRILINTA) 90 mg Tab Take 1 tablet (90 mg total) by mouth 2 (two) times a day. Dose to start this evening.       Objective  BP 98/50 (Patient Site: Left Arm, Patient Position: Sitting, Cuff Size: Adult Large)   Pulse 71   Resp 14   Ht 5' 11\" (1.803 m)   Wt (!) 242 lb (109.8 kg)   BMI 33.75 kg/m      General Appearance:    Alert, cooperative, no distress, appears stated age   Head:    Normocephalic, without obvious abnormality, " atraumatic   Throat:   Lips, mucosa, and tongue normal; teeth and gums normal   Neck:   Supple, symmetrical, trachea midline, no adenopathy;        thyroid:  No enlargement/tenderness/nodules; no carotid    bruit or JVD   Back:     Symmetric, no curvature, ROM normal, no CVA tenderness   Lungs:     Clear to auscultation bilaterally, respirations unlabored   Chest wall:    No tenderness or deformity   Heart:    Regular rate and rhythm, S1 and S2 normal, no murmur, rub   or gallop   Abdomen:     Soft, non-tender, bowel sounds active all four quadrants,     no masses, no organomegaly   Extremities:   Normal, atraumatic, no cyanosis or edema   Pulses:   2+ and symmetric all extremities   Skin:   Skin color, texture, turgor normal, no rashes or lesions     Results    Lab Results personally reviewed   Lab Results   Component Value Date    CHOL 126 04/08/2021    CHOL 132 04/02/2021     Lab Results   Component Value Date    HDL 35 (L) 04/08/2021    HDL 37 (L) 04/02/2021     Lab Results   Component Value Date    LDLCALC 69 04/08/2021    LDLCALC 68 04/02/2021     Lab Results   Component Value Date    TRIG 112 04/08/2021    TRIG 137 04/02/2021     Lab Results   Component Value Date    WBC 7.9 04/08/2021    HGB 16.3 04/08/2021    HCT 49.9 04/08/2021     04/08/2021     Lab Results   Component Value Date    CREATININE 1.53 (H) 04/12/2021    BUN 33 (H) 04/12/2021     04/12/2021    K 4.8 04/12/2021    CO2 25 04/12/2021     Review of Systems:   General: WNL  Eyes: WNL  Ears/Nose/Throat: WNL  Lungs: WNL  Heart: Shortness of Breath with activity  Stomach: Diarrhea  Bladder: WNL  Muscle/Joints: WNL  Skin: WNL  Nervous System: WNL  Mental Health: WNL     Blood: WNL

## 2021-06-30 NOTE — PROGRESS NOTES
Progress Notes by Susu Nails MD at 4/5/2021  1:50 PM     Author: Susu Nails MD Service: -- Author Type: Physician    Filed: 4/5/2021  3:00 PM Encounter Date: 4/5/2021 Status: Signed    : Susu Nails MD (Physician)         [facility] Heart Care Office Consult     Assessment:     1. Coronary artery disease due to lipid rich plaque-in 2007 apparently had a non-ST segment elevation MI involving the inferior wall, at that time based on angiogram he had normal left main, LAD with a distal 60% stenosis, normal circumflex with a large first obtuse marginal, and proximal right coronary artery 99% lesion that received a Liberte stent.  He is having episodes of chest discomfort and now jaw discomfort, suspects it is his heart and declined stress testing, so therefore we will pursue coronary angiography and possible intervention.   2. SARA (obstructive sleep apnea)-nightly CPAP which she is compliant with.   3. Type 2 diabetes mellitus, without long-term current use of insulin (H)-hemoglobin A1c down to 6.6 and is doing well.   4. Essential hypertension-of anything a little hypotensive and on minimal dose of lisinopril.   5. Tobacco abuse disorder-back to smoking any needs to discontinue.   6. Obesity due to excess calories-work on weight loss.   7.  Pure hypercholesterolemia-most recent lipids show excellent cholesterol 132 with excellent LDL of 68 on only 20 mg of simvastatin.     Plan:   1.  Stop smoking.  2.  Weight loss.  3.  Coronary angiography and possible intervention.  4.  Consider backing off on dose of lisinopril.  5.  Follow-up with me after angiography, and risk factor modification.    History of Present Illness:   Thank you for asking the [facility] Heart Care team to see Rufus Art a 60 y.o.  male  in consultation  to evaluate chest discomfort.   Patient was in his usual state of health till about 4 months ago when he developed while standing a discomfort in his chest.  He  described as a pencil type stabbing with associated nausea and shortness of breath with some diaphoresis but no radiation.  It went away after about 3 minutes.  Approximately 3 weeks later he had a similar episode that came on while walking back into his office.  He states it was more intense, and he had a near vomiting episode.  That 1 lasted about 5 or 10 minutes.  Since then, he tells me he has an ache in his jaw which comes and goes, this is reminiscent of his first heart attack and thus he is here to discuss further.    Past Medical History:   Coronary artery disease  Non-ST segment elevation inferior wall MI  Pure hypercholesterolemia  Essential hypertension  Obstructive sleep apnea  Obesity  Tobacco abuse  Diabetes type 2  Questionable TIA  Chronic kidney disease    Past history is negative for cancer, tuberculosis,  rheumatic fever, hypertension, cerebrovascular accident, chronic kidney disease, peptic ulcer disease, chronic obstructive pulmonary disease, or thyroid disorder.    Past Surgical History:   No past surgical history on file.    Family History:   Family history negative for coronary artery disease, positive for atrial fibrillation in his father in his 30s and grandmother dying in her 100s from heart failure.    Social History:   He lives at home independently with his wife, reports that he has been smoking cigarettes, less than a pack a day off and on for about 45 years having one-point quit for 20 years. He has never used smokeless tobacco.  He drinks maybe 3 drinks a month, works doing IT Photoshop, he reports that he does not use drugs. The primary care physician is Momo Unger MD    Meds:   Scheduled Meds:  Current Outpatient Medications   Medication Sig Dispense Refill   ? acetaminophen (TYLENOL) 500 MG tablet Take 500-1,000 mg by mouth as needed.     ? aspirin 81 MG EC tablet Take 81 mg by mouth as needed.     ? buPROPion (WELLBUTRIN XL) 150 MG 24 hr tablet Take 1 tablet  "by mouth daily.     ? citalopram (CELEXA) 20 MG tablet Take 30 mg by mouth daily.     ? dapagliflozin (FARXIGA) 10 mg Tab Take 1 tablet by mouth daily.     ? dulaglutide (TRULICITY) 1.5 mg/0.5 mL PnIj Inject 1.5 mg under the skin once a week.     ? gabapentin (NEURONTIN) 300 MG capsule Take 600 mg by mouth 3 (three) times a day.     ? glipiZIDE (GLUCOTROL) 5 MG tablet Take 5 mg by mouth daily before breakfast.     ? lisinopriL (PRINIVIL,ZESTRIL) 5 MG tablet Take 5 mg by mouth daily.     ? LORazepam (ATIVAN) 0.5 MG tablet Take 0.5-1 mg by mouth as needed.     ? metFORMIN (GLUCOPHAGE) 1000 MG tablet Take 1,000 mg by mouth 2 (two) times a day.     ? metoprolol tartrate (LOPRESSOR) 25 mg Take 25 mg by mouth 2 (two) times a day.     ? nitroglycerin (NITROSTAT) 0.4 MG SL tablet see administration instructions.     ? simvastatin (ZOCOR) 40 MG tablet Take 20 mg by mouth at bedtime.       No current facility-administered medications for this visit.        PRN Meds:.    Allergies:   Patient has no known allergies.    Objective:      Physical Exam  (!) 247 lb (112 kg)  5' 11.25\" (1.81 m)  Body mass index is 34.21 kg/m .  BP 94/54 (Patient Site: Left Arm, Patient Position: Sitting, Cuff Size: Adult Regular)   Pulse 83   Resp 16   Ht 5' 11.25\" (1.81 m)   Wt (!) 247 lb (112 kg)   SpO2 94%   BMI 34.21 kg/m      General Appearance:   Alert, cooperative and in no acute distress.   HEENT:  No scleral icterus; the mucous membranes were pink and moist.   Neck: JVP normal. No thyromegaly. No HJR   Chest: The spine was straight. The chest was symmetric.   Lungs:   Respirations unlabored; the lungs are clear to auscultation.   Cardiovascular:   S1 and S2 without murmur, clicks or rubs. Brachial, radial, carotid and posterior tibial pulses are intact and symetrical.  No carotid bruits noted   Abdomen:  No organomegaly, masses, bruits, or tenderness. Bowels sounds are present   Extremities: No cyanosis, clubbing, or edema.   Skin: No " xanthelasma.   Neurologic: Mood and affect are appropriate.         Lab Reviewed Personally by myself  Lab Results   Component Value Date     04/02/2021    K 4.6 04/02/2021     04/02/2021    CO2 23 04/02/2021    BUN 24 (H) 04/02/2021    CREATININE 1.34 (H) 04/02/2021    CALCIUM 9.1 04/02/2021     No results found for: WBC, HGB, HCT, MCV, PLT  Lab Results   Component Value Date    CHOL 132 04/02/2021    TRIG 137 04/02/2021    HDL 37 (L) 04/02/2021     No results found for: BNP    ECG personally reviewed by myself shows not available but apparently sinus rhythm with right bundle branch block pattern         Review of Systems:     Review of Systems:   General: Weight Loss  Eyes: Visual Distubance  Ears/Nose/Throat: WNL  Lungs: Snoring  Heart: Chest Pain, Arm Pain, Shortness of Breath with activity  Stomach: Constipation, Nausea  Bladder: WNL  Muscle/Joints: Joint Pain  Skin: WNL  Nervous System: Loss of Balance  Mental Health: Depression     Blood: WNL

## 2021-07-04 NOTE — PROGRESS NOTES
Progress Notes by Angelique Koo RN at 6/10/2021  9:41 AM     Author: Angelique Koo RN Service: -- Author Type: Registered Nurse    Filed: 6/10/2021  9:52 AM Encounter Date: 6/10/2021 Status: Signed    : Angelique Koo RN (Registered Nurse)       Susu Nails MD Caswell, Mallory J, RN             Thanks my mistake hit 9 not 6 so yes 60 mg bolus then 10 mg a day.   LF    Previous Messages    ----- Message -----   From: Angelique Koo RN   Sent: 6/9/2021   3:36 PM CDT   To: Susu Nails MD     Hi Dr. Nails   Eduardo is still short of breath and is agreeable to switching over to Effient. Just clarifying loading dose of Effient,- below says 90 mg, previously I had thought the loading dose was 60 mg.   Thanks,   Mal         Susu Nails MD Caswell, Mallory J, RN             Let him know stress looks good is he still shortness of breath?   If so need to switch from tiacelgor to effient, 90 load and 10 a day.   LF      Brilinta removed from medication list. Effient ordered- 60 mg loading dose x1 then 10 mg daily. [place] [place]

## 2021-07-05 ENCOUNTER — COMMUNICATION - HEALTHEAST (OUTPATIENT)
Dept: CARDIOLOGY | Facility: CLINIC | Age: 61
End: 2021-07-05

## 2021-07-05 DIAGNOSIS — I25.83 CORONARY ARTERY DISEASE DUE TO LIPID RICH PLAQUE: ICD-10-CM

## 2021-07-05 DIAGNOSIS — I25.10 CORONARY ARTERY DISEASE DUE TO LIPID RICH PLAQUE: ICD-10-CM

## 2021-08-16 ENCOUNTER — LAB REQUISITION (OUTPATIENT)
Dept: LAB | Facility: CLINIC | Age: 61
End: 2021-08-16

## 2021-08-16 DIAGNOSIS — I12.9 HYPERTENSIVE CHRONIC KIDNEY DISEASE WITH STAGE 1 THROUGH STAGE 4 CHRONIC KIDNEY DISEASE, OR UNSPECIFIED CHRONIC KIDNEY DISEASE: ICD-10-CM

## 2021-08-16 LAB
ALBUMIN SERPL-MCNC: 4 G/DL (ref 3.5–5)
ALP SERPL-CCNC: 126 U/L (ref 45–120)
ALT SERPL W P-5'-P-CCNC: 35 U/L (ref 0–45)
ANION GAP SERPL CALCULATED.3IONS-SCNC: 10 MMOL/L (ref 5–18)
AST SERPL W P-5'-P-CCNC: 19 U/L (ref 0–40)
BILIRUB SERPL-MCNC: 0.6 MG/DL (ref 0–1)
BUN SERPL-MCNC: 23 MG/DL (ref 8–22)
CALCIUM SERPL-MCNC: 10.2 MG/DL (ref 8.5–10.5)
CHLORIDE BLD-SCNC: 108 MMOL/L (ref 98–107)
CO2 SERPL-SCNC: 24 MMOL/L (ref 22–31)
CREAT SERPL-MCNC: 1.43 MG/DL (ref 0.7–1.3)
GFR SERPL CREATININE-BSD FRML MDRD: 53 ML/MIN/1.73M2
GLUCOSE BLD-MCNC: 148 MG/DL (ref 70–125)
POTASSIUM BLD-SCNC: 4.9 MMOL/L (ref 3.5–5)
PROT SERPL-MCNC: 6.8 G/DL (ref 6–8)
SODIUM SERPL-SCNC: 142 MMOL/L (ref 136–145)

## 2021-08-16 PROCEDURE — 80053 COMPREHEN METABOLIC PANEL: CPT | Performed by: FAMILY MEDICINE

## 2021-10-16 ENCOUNTER — HEALTH MAINTENANCE LETTER (OUTPATIENT)
Age: 61
End: 2021-10-16

## 2021-12-30 ENCOUNTER — TELEPHONE (OUTPATIENT)
Dept: CARDIOLOGY | Facility: CLINIC | Age: 61
End: 2021-12-30
Payer: COMMERCIAL

## 2021-12-30 DIAGNOSIS — I25.83 CORONARY ARTERY DISEASE DUE TO LIPID RICH PLAQUE: Primary | ICD-10-CM

## 2021-12-30 DIAGNOSIS — I25.10 CORONARY ARTERY DISEASE DUE TO LIPID RICH PLAQUE: Primary | ICD-10-CM

## 2021-12-30 DIAGNOSIS — Z95.5 CORONARY STENT PATENT: ICD-10-CM

## 2021-12-30 RX ORDER — PRASUGREL 10 MG/1
10 TABLET, FILM COATED ORAL DAILY
Qty: 90 TABLET | Refills: 1 | Status: SHIPPED | OUTPATIENT
Start: 2021-12-30 | End: 2024-10-02

## 2021-12-30 NOTE — TELEPHONE ENCOUNTER
Requesting more information.Need a current script in chart to do the PA.  PA team needs daily dosing, dispensing amount and diagnosis to complete PA.

## 2021-12-30 NOTE — TELEPHONE ENCOUNTER
Pa for Prasugrel 10 daily.   Pt had reaction to Brilinta with shortness of Breath. 6/10 note.   [facility] [place] [id number] is asking for different med.

## 2022-01-06 ENCOUNTER — TELEPHONE (OUTPATIENT)
Dept: CARDIOLOGY | Facility: CLINIC | Age: 62
End: 2022-01-06
Payer: COMMERCIAL

## 2022-01-06 NOTE — TELEPHONE ENCOUNTER
No pa needed- this medication is covered by the plan without needing a pa.  The pharmacy received a paid claim on 01/5/22.

## 2022-01-06 NOTE — TELEPHONE ENCOUNTER
Pa for Prasugrel   Plan prefers Brilinta or Plavix. Pt has reaction to Brilinta.   Not sure if this PA has been done.

## 2022-01-28 ENCOUNTER — LAB REQUISITION (OUTPATIENT)
Dept: LAB | Facility: CLINIC | Age: 62
End: 2022-01-28

## 2022-01-28 DIAGNOSIS — I12.9 HYPERTENSIVE CHRONIC KIDNEY DISEASE WITH STAGE 1 THROUGH STAGE 4 CHRONIC KIDNEY DISEASE, OR UNSPECIFIED CHRONIC KIDNEY DISEASE: ICD-10-CM

## 2022-01-28 DIAGNOSIS — E78.5 HYPERLIPIDEMIA, UNSPECIFIED: ICD-10-CM

## 2022-01-28 LAB
ALBUMIN SERPL-MCNC: 3.9 G/DL (ref 3.5–5)
ALP SERPL-CCNC: 107 U/L (ref 45–120)
ALT SERPL W P-5'-P-CCNC: 40 U/L (ref 0–45)
ANION GAP SERPL CALCULATED.3IONS-SCNC: 11 MMOL/L (ref 5–18)
AST SERPL W P-5'-P-CCNC: 21 U/L (ref 0–40)
BILIRUB SERPL-MCNC: 0.6 MG/DL (ref 0–1)
BUN SERPL-MCNC: 21 MG/DL (ref 8–22)
CALCIUM SERPL-MCNC: 9.7 MG/DL (ref 8.5–10.5)
CHLORIDE BLD-SCNC: 106 MMOL/L (ref 98–107)
CHOLEST SERPL-MCNC: 109 MG/DL
CO2 SERPL-SCNC: 22 MMOL/L (ref 22–31)
CREAT SERPL-MCNC: 1.49 MG/DL (ref 0.7–1.3)
FASTING STATUS PATIENT QL REPORTED: ABNORMAL
GFR SERPL CREATININE-BSD FRML MDRD: 53 ML/MIN/1.73M2
GLUCOSE BLD-MCNC: 199 MG/DL (ref 70–125)
HDLC SERPL-MCNC: 32 MG/DL
LDLC SERPL CALC-MCNC: 43 MG/DL
MAGNESIUM SERPL-MCNC: 2 MG/DL (ref 1.8–2.6)
POTASSIUM BLD-SCNC: 4.6 MMOL/L (ref 3.5–5)
PROT SERPL-MCNC: 6.7 G/DL (ref 6–8)
SODIUM SERPL-SCNC: 139 MMOL/L (ref 136–145)
TRIGL SERPL-MCNC: 172 MG/DL

## 2022-01-28 PROCEDURE — 83735 ASSAY OF MAGNESIUM: CPT | Performed by: FAMILY MEDICINE

## 2022-01-28 PROCEDURE — 80053 COMPREHEN METABOLIC PANEL: CPT | Performed by: FAMILY MEDICINE

## 2022-01-28 PROCEDURE — 80061 LIPID PANEL: CPT | Performed by: FAMILY MEDICINE

## 2022-02-05 ENCOUNTER — HEALTH MAINTENANCE LETTER (OUTPATIENT)
Age: 62
End: 2022-02-05

## 2022-04-01 ENCOUNTER — OFFICE VISIT (OUTPATIENT)
Dept: CARDIOLOGY | Facility: CLINIC | Age: 62
End: 2022-04-01
Payer: COMMERCIAL

## 2022-04-01 VITALS
WEIGHT: 256 LBS | SYSTOLIC BLOOD PRESSURE: 104 MMHG | DIASTOLIC BLOOD PRESSURE: 52 MMHG | HEIGHT: 71 IN | HEART RATE: 92 BPM | RESPIRATION RATE: 16 BRPM | BODY MASS INDEX: 35.84 KG/M2

## 2022-04-01 DIAGNOSIS — E11.00 TYPE 2 DIABETES MELLITUS WITH HYPEROSMOLARITY WITHOUT COMA, WITHOUT LONG-TERM CURRENT USE OF INSULIN (H): ICD-10-CM

## 2022-04-01 DIAGNOSIS — I25.10 CORONARY ARTERY DISEASE DUE TO LIPID RICH PLAQUE: Primary | ICD-10-CM

## 2022-04-01 DIAGNOSIS — E78.00 PURE HYPERCHOLESTEROLEMIA: ICD-10-CM

## 2022-04-01 DIAGNOSIS — I10 ESSENTIAL HYPERTENSION: ICD-10-CM

## 2022-04-01 DIAGNOSIS — I25.83 CORONARY ARTERY DISEASE DUE TO LIPID RICH PLAQUE: Primary | ICD-10-CM

## 2022-04-01 DIAGNOSIS — E66.01 CLASS 2 SEVERE OBESITY DUE TO EXCESS CALORIES WITH SERIOUS COMORBIDITY IN ADULT, UNSPECIFIED BMI (H): ICD-10-CM

## 2022-04-01 DIAGNOSIS — G47.33 OSA (OBSTRUCTIVE SLEEP APNEA): ICD-10-CM

## 2022-04-01 DIAGNOSIS — E66.812 CLASS 2 SEVERE OBESITY DUE TO EXCESS CALORIES WITH SERIOUS COMORBIDITY IN ADULT, UNSPECIFIED BMI (H): ICD-10-CM

## 2022-04-01 DIAGNOSIS — Z72.0 TOBACCO ABUSE DISORDER: ICD-10-CM

## 2022-04-01 PROCEDURE — 99214 OFFICE O/P EST MOD 30 MIN: CPT | Performed by: INTERNAL MEDICINE

## 2022-04-01 RX ORDER — DAPAGLIFLOZIN 10 MG/1
1 TABLET, FILM COATED ORAL DAILY
COMMUNITY
Start: 2020-11-16 | End: 2024-10-02

## 2022-04-01 RX ORDER — GABAPENTIN 300 MG/1
900 CAPSULE ORAL 3 TIMES DAILY PRN
COMMUNITY
Start: 2022-03-28

## 2022-04-01 RX ORDER — BUPROPION HYDROCHLORIDE 150 MG/1
150 TABLET ORAL DAILY
COMMUNITY
Start: 2021-04-02

## 2022-04-01 RX ORDER — DULAGLUTIDE 1.5 MG/.5ML
1.5 INJECTION, SOLUTION SUBCUTANEOUS WEEKLY
COMMUNITY
End: 2023-12-28

## 2022-04-01 RX ORDER — ATORVASTATIN CALCIUM 80 MG/1
80 TABLET, FILM COATED ORAL DAILY
COMMUNITY
Start: 2022-03-31

## 2022-04-01 RX ORDER — CLOPIDOGREL BISULFATE 75 MG/1
75 TABLET ORAL DAILY
Qty: 90 TABLET | Refills: 4 | Status: SHIPPED | OUTPATIENT
Start: 2022-04-01 | End: 2023-06-22

## 2022-04-01 NOTE — LETTER
4/1/2022    Momo Unger MD  [facility] [address]  [facility] [address]    RE: Rufus VEGA Rubens       Dear Colleague,     I had the pleasure of seeing Rufus Art in the [facility].      [facility]  Heart Care Clinic Follow-up Note    Assessment & Plan        (I25.10,  I25.83) Coronary artery disease due to lipid rich plaque  (primary encounter diagnosis)  Comment: Angiography due to symptoms April 8, 2021 showed normal left main, proximal LAD 90% lesion that received a drug-coated stent, first diagonal 70% lesion.  This is too small to receive intervention. Circumflex was normal with third obtuse marginal artery having sequential 60 and 40% lesions, right coronary had a proximal 20% lesion with a distal PDA 50% stenosis.  No significant symptoms, this is his second episode of intervention, and given this when he runs out of Effient later on this month we will discontinue aspirin and use Plavix 75 mg a day indefinitely.    (I10) Essential hypertension  Comment: Under good control on metoprolol and lisinopril.  In a year if doing well discontinue metoprolol and uptitrate lisinopril.    (E78.00) Pure hypercholesterolemia  Comment: Total cholesterol 109 with an LDL of 43 which is excellent and continue current statin.    (E11.00) Type 2 diabetes mellitus with hyperosmolarity without coma, without long-term current use of insulin (H)  Comment: Hemoglobin A1c elevated at 7.4, on Farxiga, Metformin, Trulicity, as well as glipizide and defer to primary.    (E66.01) Class 2 severe obesity due to excess calories with serious comorbidity in adult, unspecified BMI (H)  Comment: Work on weight loss.    (G47.33) SARA (obstructive sleep apnea)  Comment: Nightly CPAP.    (Z72.0) Tobacco abuse disorder  Comment: Needs to stop and he knows this.    Plan  1.  Stop smoking.  2.  Work on weight loss.  3.  When Effient runs out discontinue aspirin take chronic Plavix 75 mg a day.  4.   Follow-up me 1 year or sooner if needed.  5.  In 1 year, will back off on metoprolol and go up on dose of lisinopril most likely to 10 mg a day.    Subjective  CC: 61-year-old white gentleman being seen in follow-up today.  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  He does however walk on a treadmill at home.  He has no major issues. Patient complains of no syncope, dizziness, fatigue, fevers, chest pain, palpitations, shortness of breath, PND, orthopnea, nausea, vomiting, or edema.    Medications  Current Outpatient Medications   Medication Sig Dispense Refill     acetaminophen (TYLENOL) 500 MG tablet Take 500-1,000 mg by mouth every 6 hours as needed.       aspirin 81 MG EC tablet Take 81 mg by mouth daily.       atorvastatin (LIPITOR) 80 MG tablet Take 80 mg by mouth daily       buPROPion (WELLBUTRIN XL) 150 MG 24 hr tablet Take 150 mg by mouth daily       Citalopram Hydrobromide (CELEXA PO) Take 20 mg by mouth daily.       clopidogrel (PLAVIX) 75 MG tablet Take 1 tablet (75 mg) by mouth daily 90 tablet 4     dapagliflozin (FARXIGA) 10 MG TABS tablet Take 1 tablet by mouth daily       DIAZEPAM PO Take 5-10 mg by mouth daily as needed.       dulaglutide (TRULICITY) 1.5 MG/0.5ML pen Inject 1.5 mg Subcutaneous once a week       gabapentin (NEURONTIN) 300 MG capsule Take 900 mg by mouth 3 times daily as needed       GLIPIZIDE PO Take 5 mg by mouth every morning (before breakfast).       LISINOPRIL PO Take 5 mg by mouth daily.       METFORMIN HCL PO Take 1,000 mg by mouth 2 times daily (with meals).       METOPROLOL TARTRATE PO Take 25 mg by mouth 2 times daily.       nitroglycerin (NITROSTAT) 0.4 MG SL tablet Place 0.4 mg under the tongue every 5 minutes as needed.       prasugrel (EFFIENT) 10 MG TABS tablet Take 1 tablet (10 mg) by mouth daily 90 tablet 1       Objective  /52 (BP Location: Right arm, Patient Position: Sitting, Cuff Size: Adult Large)   Pulse 92   Resp  "16   Ht 1.803 m (5' 11\")   Wt 116.1 kg (256 lb)   BMI 35.70 kg/m      General Appearance:    Alert, cooperative, no distress, appears stated age   Head:    Normocephalic, without obvious abnormality, atraumatic   Throat:   Lips, mucosa, and tongue normal; teeth and gums normal   Neck:   Supple, symmetrical, trachea midline, no adenopathy;        thyroid:  No enlargement/tenderness/nodules; no carotid    bruit or JVD   Back:     Symmetric, no curvature, ROM normal, no CVA tenderness   Lungs:     Clear to auscultation bilaterally, respirations unlabored   Chest wall:    No tenderness or deformity   Heart:    Regular rate and rhythm, S1 and S2 normal, no murmur, rub   or gallop   Abdomen:     Soft, non-tender, bowel sounds active all four quadrants,     no masses, no organomegaly   Extremities:   Normal, atraumatic, no cyanosis or edema   Pulses:   2+ and symmetric all upper extremities +1 lower extremities   Skin:   Skin color, texture, turgor normal, no rashes or lesions     Results    Lab Results personally reviewed   Lab Results   Component Value Date    CHOL 109 01/28/2022    CHOL 126 04/08/2021     Lab Results   Component Value Date    HDL 32 (L) 01/28/2022    HDL 35 (L) 04/08/2021     No components found for: LDLCALC  Lab Results   Component Value Date    TRIG 172 (H) 01/28/2022    TRIG 112 04/08/2021     Lab Results   Component Value Date    WBC 7.9 04/08/2021    HGB 16.3 04/08/2021    HCT 49.9 04/08/2021     04/08/2021     Lab Results   Component Value Date    BUN 21 01/28/2022     01/28/2022    CO2 22 01/28/2022             Thank you for allowing me to participate in the care of your patient.      Sincerely,     LATANYA ESCALANTE MD     [facility]  cc:   No referring provider defined for this encounter.        "

## 2022-04-01 NOTE — PATIENT INSTRUCTIONS
Mr Rufus Art,  I enjoyed visiting with you again today.  I am glad to hear you are doing well.  Per our conversation when done with the EFFIENT throw bottle out and do not refill and stop aspirin and take  PLAVIX.  I will plan on seeing you 1 year or sooner if needed.  Kory Nails

## 2022-04-01 NOTE — PROGRESS NOTES
[facility]  Heart Care Clinic Follow-up Note    Assessment & Plan        (I25.10,  I25.83) Coronary artery disease due to lipid rich plaque  (primary encounter diagnosis)  Comment: Angiography due to symptoms April 8, 2021 showed normal left main, proximal LAD 90% lesion that received a drug-coated stent, first diagonal 70% lesion.  This is too small to receive intervention. Circumflex was normal with third obtuse marginal artery having sequential 60 and 40% lesions, right coronary had a proximal 20% lesion with a distal PDA 50% stenosis.  No significant symptoms, this is his second episode of intervention, and given this when he runs out of Effient later on this month we will discontinue aspirin and use Plavix 75 mg a day indefinitely.    (I10) Essential hypertension  Comment: Under good control on metoprolol and lisinopril.  In a year if doing well discontinue metoprolol and uptitrate lisinopril.    (E78.00) Pure hypercholesterolemia  Comment: Total cholesterol 109 with an LDL of 43 which is excellent and continue current statin.    (E11.00) Type 2 diabetes mellitus with hyperosmolarity without coma, without long-term current use of insulin (H)  Comment: Hemoglobin A1c elevated at 7.4, on Farxiga, Metformin, Trulicity, as well as glipizide and defer to primary.    (E66.01) Class 2 severe obesity due to excess calories with serious comorbidity in adult, unspecified BMI (H)  Comment: Work on weight loss.    (G47.33) SARA (obstructive sleep apnea)  Comment: Nightly CPAP.    (Z72.0) Tobacco abuse disorder  Comment: Needs to stop and he knows this.    Plan  1.  Stop smoking.  2.  Work on weight loss.  3.  When Effient runs out discontinue aspirin take chronic Plavix 75 mg a day.  4.  Follow-up me 1 year or sooner if needed.  5.  In 1 year, will back off on metoprolol and go up on dose of lisinopril most likely to 10 mg a day.    Subjective  CC: 61-year-old white gentleman being seen in follow-up today.  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  He does however walk on a treadmill at home.  He has no major issues. Patient complains of no syncope, dizziness, fatigue, fevers, chest pain, palpitations, shortness of breath, PND, orthopnea, nausea, vomiting, or edema.    Medications  Current Outpatient Medications   Medication Sig Dispense Refill     acetaminophen (TYLENOL) 500 MG tablet Take 500-1,000 mg by mouth every 6 hours as needed.       aspirin 81 MG EC tablet Take 81 mg by mouth daily.       atorvastatin (LIPITOR) 80 MG tablet Take 80 mg by mouth daily       buPROPion (WELLBUTRIN XL) 150 MG 24 hr tablet Take 150 mg by mouth daily       Citalopram Hydrobromide (CELEXA PO) Take 20 mg by mouth daily.       clopidogrel (PLAVIX) 75 MG tablet Take 1 tablet (75 mg) by mouth daily 90 tablet 4     dapagliflozin (FARXIGA) 10 MG TABS tablet Take 1 tablet by mouth daily       DIAZEPAM PO Take 5-10 mg by mouth daily as needed.       dulaglutide (TRULICITY) 1.5 MG/0.5ML pen Inject 1.5 mg Subcutaneous once a week       gabapentin (NEURONTIN) 300 MG capsule Take 900 mg by mouth 3 times daily as needed       GLIPIZIDE PO Take 5 mg by mouth every morning (before breakfast).       LISINOPRIL PO Take 5 mg by mouth daily.       METFORMIN HCL PO Take 1,000 mg by mouth 2 times daily (with meals).       METOPROLOL TARTRATE PO Take 25 mg by mouth 2 times daily.       nitroglycerin (NITROSTAT) 0.4 MG SL tablet Place 0.4 mg under the tongue every 5 minutes as needed.       prasugrel (EFFIENT) 10 MG TABS tablet Take 1 tablet (10 mg) by mouth daily 90 tablet 1       Objective  /52 (BP Location: Right arm, Patient Position: Sitting, Cuff Size: Adult Large)   Pulse 92   Resp 16   Ht 1.803 m (5' 11\")   Wt 116.1 kg (256 lb)   BMI 35.70 kg/m      General Appearance:    Alert, cooperative, no distress, appears stated age   Head:    Normocephalic, without obvious abnormality, atraumatic   Throat:   " Lips, mucosa, and tongue normal; teeth and gums normal   Neck:   Supple, symmetrical, trachea midline, no adenopathy;        thyroid:  No enlargement/tenderness/nodules; no carotid    bruit or JVD   Back:     Symmetric, no curvature, ROM normal, no CVA tenderness   Lungs:     Clear to auscultation bilaterally, respirations unlabored   Chest wall:    No tenderness or deformity   Heart:    Regular rate and rhythm, S1 and S2 normal, no murmur, rub   or gallop   Abdomen:     Soft, non-tender, bowel sounds active all four quadrants,     no masses, no organomegaly   Extremities:   Normal, atraumatic, no cyanosis or edema   Pulses:   2+ and symmetric all upper extremities +1 lower extremities   Skin:   Skin color, texture, turgor normal, no rashes or lesions     Results    Lab Results personally reviewed   Lab Results   Component Value Date    CHOL 109 01/28/2022    CHOL 126 04/08/2021     Lab Results   Component Value Date    HDL 32 (L) 01/28/2022    HDL 35 (L) 04/08/2021     No components found for: LDLCALC  Lab Results   Component Value Date    TRIG 172 (H) 01/28/2022    TRIG 112 04/08/2021     Lab Results   Component Value Date    WBC 7.9 04/08/2021    HGB 16.3 04/08/2021    HCT 49.9 04/08/2021     04/08/2021     Lab Results   Component Value Date    BUN 21 01/28/2022     01/28/2022    CO2 22 01/28/2022

## 2022-04-29 ENCOUNTER — LAB REQUISITION (OUTPATIENT)
Dept: LAB | Facility: CLINIC | Age: 62
End: 2022-04-29

## 2022-04-29 DIAGNOSIS — N18.31 CHRONIC KIDNEY DISEASE, STAGE 3A (H): ICD-10-CM

## 2022-04-29 LAB
ANION GAP SERPL CALCULATED.3IONS-SCNC: 11 MMOL/L (ref 5–18)
BUN SERPL-MCNC: 19 MG/DL (ref 8–22)
CALCIUM SERPL-MCNC: 9.1 MG/DL (ref 8.5–10.5)
CHLORIDE BLD-SCNC: 108 MMOL/L (ref 98–107)
CO2 SERPL-SCNC: 21 MMOL/L (ref 22–31)
CREAT SERPL-MCNC: 1.49 MG/DL (ref 0.7–1.3)
GFR SERPL CREATININE-BSD FRML MDRD: 53 ML/MIN/1.73M2
GLUCOSE BLD-MCNC: 204 MG/DL (ref 70–125)
POTASSIUM BLD-SCNC: 4.4 MMOL/L (ref 3.5–5)
SODIUM SERPL-SCNC: 140 MMOL/L (ref 136–145)

## 2022-04-29 PROCEDURE — 80048 BASIC METABOLIC PNL TOTAL CA: CPT | Performed by: FAMILY MEDICINE

## 2022-05-28 ENCOUNTER — HEALTH MAINTENANCE LETTER (OUTPATIENT)
Age: 62
End: 2022-05-28

## 2022-07-23 ENCOUNTER — HEALTH MAINTENANCE LETTER (OUTPATIENT)
Age: 62
End: 2022-07-23

## 2022-09-19 ENCOUNTER — LAB REQUISITION (OUTPATIENT)
Dept: LAB | Facility: CLINIC | Age: 62
End: 2022-09-19
Payer: COMMERCIAL

## 2022-09-19 DIAGNOSIS — E78.5 HYPERLIPIDEMIA, UNSPECIFIED: ICD-10-CM

## 2022-09-19 DIAGNOSIS — I12.9 HYPERTENSIVE CHRONIC KIDNEY DISEASE WITH STAGE 1 THROUGH STAGE 4 CHRONIC KIDNEY DISEASE, OR UNSPECIFIED CHRONIC KIDNEY DISEASE: ICD-10-CM

## 2022-09-19 LAB
ALBUMIN SERPL BCG-MCNC: 4 G/DL (ref 3.5–5.2)
ALP SERPL-CCNC: 118 U/L (ref 40–129)
ALT SERPL W P-5'-P-CCNC: 37 U/L (ref 10–50)
ANION GAP SERPL CALCULATED.3IONS-SCNC: 10 MMOL/L (ref 7–15)
AST SERPL W P-5'-P-CCNC: 16 U/L (ref 10–50)
BILIRUB SERPL-MCNC: 0.2 MG/DL
BUN SERPL-MCNC: 23.4 MG/DL (ref 8–23)
CALCIUM SERPL-MCNC: 9 MG/DL (ref 8.8–10.2)
CHLORIDE SERPL-SCNC: 106 MMOL/L (ref 98–107)
CHOLEST SERPL-MCNC: 125 MG/DL
CREAT SERPL-MCNC: 1.4 MG/DL (ref 0.67–1.17)
DEPRECATED HCO3 PLAS-SCNC: 23 MMOL/L (ref 22–29)
GFR SERPL CREATININE-BSD FRML MDRD: 57 ML/MIN/1.73M2
GLUCOSE SERPL-MCNC: 271 MG/DL (ref 70–99)
HDLC SERPL-MCNC: 28 MG/DL
LDLC SERPL CALC-MCNC: 18 MG/DL
NONHDLC SERPL-MCNC: 97 MG/DL
POTASSIUM SERPL-SCNC: 4.2 MMOL/L (ref 3.4–5.3)
PROT SERPL-MCNC: 6.1 G/DL (ref 6.4–8.3)
SODIUM SERPL-SCNC: 139 MMOL/L (ref 136–145)
TRIGL SERPL-MCNC: 393 MG/DL

## 2022-09-19 PROCEDURE — 80061 LIPID PANEL: CPT | Mod: ORL | Performed by: FAMILY MEDICINE

## 2022-09-19 PROCEDURE — 80053 COMPREHEN METABOLIC PANEL: CPT | Mod: ORL | Performed by: FAMILY MEDICINE

## 2022-10-01 ENCOUNTER — HEALTH MAINTENANCE LETTER (OUTPATIENT)
Age: 62
End: 2022-10-01

## 2023-01-09 ENCOUNTER — LAB REQUISITION (OUTPATIENT)
Dept: LAB | Facility: CLINIC | Age: 63
End: 2023-01-09
Payer: COMMERCIAL

## 2023-01-09 DIAGNOSIS — E11.40 TYPE 2 DIABETES MELLITUS WITH DIABETIC NEUROPATHY, UNSPECIFIED (H): ICD-10-CM

## 2023-01-09 LAB
ALBUMIN SERPL BCG-MCNC: 4.1 G/DL (ref 3.5–5.2)
ALP SERPL-CCNC: 107 U/L (ref 40–129)
ALT SERPL W P-5'-P-CCNC: 29 U/L (ref 10–50)
ANION GAP SERPL CALCULATED.3IONS-SCNC: 15 MMOL/L (ref 7–15)
AST SERPL W P-5'-P-CCNC: 19 U/L (ref 10–50)
BILIRUB SERPL-MCNC: 0.5 MG/DL
BUN SERPL-MCNC: 21.7 MG/DL (ref 8–23)
CALCIUM SERPL-MCNC: 9.3 MG/DL (ref 8.8–10.2)
CHLORIDE SERPL-SCNC: 109 MMOL/L (ref 98–107)
CREAT SERPL-MCNC: 1.33 MG/DL (ref 0.67–1.17)
DEPRECATED HCO3 PLAS-SCNC: 19 MMOL/L (ref 22–29)
GFR SERPL CREATININE-BSD FRML MDRD: 60 ML/MIN/1.73M2
GLUCOSE SERPL-MCNC: 132 MG/DL (ref 70–99)
HBA1C MFR BLD: 6.5 %
POTASSIUM SERPL-SCNC: 4.2 MMOL/L (ref 3.4–5.3)
PROT SERPL-MCNC: 6.2 G/DL (ref 6.4–8.3)
SODIUM SERPL-SCNC: 143 MMOL/L (ref 136–145)

## 2023-01-09 PROCEDURE — 83036 HEMOGLOBIN GLYCOSYLATED A1C: CPT | Mod: ORL | Performed by: FAMILY MEDICINE

## 2023-01-09 PROCEDURE — 80053 COMPREHEN METABOLIC PANEL: CPT | Mod: ORL | Performed by: FAMILY MEDICINE

## 2023-04-13 ENCOUNTER — LAB REQUISITION (OUTPATIENT)
Dept: LAB | Facility: CLINIC | Age: 63
End: 2023-04-13
Payer: COMMERCIAL

## 2023-04-13 DIAGNOSIS — E78.5 HYPERLIPIDEMIA, UNSPECIFIED: ICD-10-CM

## 2023-04-13 DIAGNOSIS — E11.65 TYPE 2 DIABETES MELLITUS WITH HYPERGLYCEMIA (H): ICD-10-CM

## 2023-04-13 DIAGNOSIS — I12.9 HYPERTENSIVE CHRONIC KIDNEY DISEASE WITH STAGE 1 THROUGH STAGE 4 CHRONIC KIDNEY DISEASE, OR UNSPECIFIED CHRONIC KIDNEY DISEASE: ICD-10-CM

## 2023-04-13 LAB
ALBUMIN SERPL BCG-MCNC: 4.2 G/DL (ref 3.5–5.2)
ALP SERPL-CCNC: 113 U/L (ref 40–129)
ALT SERPL W P-5'-P-CCNC: 29 U/L (ref 10–50)
ANION GAP SERPL CALCULATED.3IONS-SCNC: 12 MMOL/L (ref 7–15)
AST SERPL W P-5'-P-CCNC: 17 U/L (ref 10–50)
BILIRUB SERPL-MCNC: 0.5 MG/DL
BUN SERPL-MCNC: 16.5 MG/DL (ref 8–23)
CALCIUM SERPL-MCNC: 9.4 MG/DL (ref 8.8–10.2)
CHLORIDE SERPL-SCNC: 105 MMOL/L (ref 98–107)
CHOLEST SERPL-MCNC: 93 MG/DL
CREAT SERPL-MCNC: 1.39 MG/DL (ref 0.67–1.17)
CREAT UR-MCNC: 64.2 MG/DL
DEPRECATED HCO3 PLAS-SCNC: 21 MMOL/L (ref 22–29)
GFR SERPL CREATININE-BSD FRML MDRD: 57 ML/MIN/1.73M2
GLUCOSE SERPL-MCNC: 142 MG/DL (ref 70–99)
HDLC SERPL-MCNC: 35 MG/DL
LDLC SERPL CALC-MCNC: 41 MG/DL
MAGNESIUM SERPL-MCNC: 2.1 MG/DL (ref 1.7–2.3)
MICROALBUMIN UR-MCNC: 55.2 MG/L
MICROALBUMIN/CREAT UR: 85.98 MG/G CR (ref 0–17)
NONHDLC SERPL-MCNC: 58 MG/DL
POTASSIUM SERPL-SCNC: 4.2 MMOL/L (ref 3.4–5.3)
PROT SERPL-MCNC: 6.4 G/DL (ref 6.4–8.3)
SODIUM SERPL-SCNC: 138 MMOL/L (ref 136–145)
TRIGL SERPL-MCNC: 87 MG/DL

## 2023-04-13 PROCEDURE — 80053 COMPREHEN METABOLIC PANEL: CPT | Mod: ORL | Performed by: FAMILY MEDICINE

## 2023-04-13 PROCEDURE — 80061 LIPID PANEL: CPT | Mod: ORL | Performed by: FAMILY MEDICINE

## 2023-04-13 PROCEDURE — 82570 ASSAY OF URINE CREATININE: CPT | Mod: ORL | Performed by: FAMILY MEDICINE

## 2023-04-13 PROCEDURE — 83735 ASSAY OF MAGNESIUM: CPT | Mod: ORL | Performed by: FAMILY MEDICINE

## 2023-05-14 ENCOUNTER — HEALTH MAINTENANCE LETTER (OUTPATIENT)
Age: 63
End: 2023-05-14

## 2023-06-22 DIAGNOSIS — I25.83 CORONARY ARTERY DISEASE DUE TO LIPID RICH PLAQUE: ICD-10-CM

## 2023-06-22 DIAGNOSIS — I25.10 CORONARY ARTERY DISEASE DUE TO LIPID RICH PLAQUE: ICD-10-CM

## 2023-06-22 RX ORDER — CLOPIDOGREL BISULFATE 75 MG/1
75 TABLET ORAL DAILY
Qty: 90 TABLET | Refills: 0 | Status: SHIPPED | OUTPATIENT
Start: 2023-06-22 | End: 2023-12-07

## 2023-06-23 DIAGNOSIS — I25.10 CORONARY ARTERY DISEASE DUE TO LIPID RICH PLAQUE: ICD-10-CM

## 2023-06-23 DIAGNOSIS — I25.83 CORONARY ARTERY DISEASE DUE TO LIPID RICH PLAQUE: ICD-10-CM

## 2023-06-23 RX ORDER — CLOPIDOGREL BISULFATE 75 MG/1
TABLET ORAL
Qty: 90 TABLET | Refills: 0 | OUTPATIENT
Start: 2023-06-23

## 2023-08-06 ENCOUNTER — HEALTH MAINTENANCE LETTER (OUTPATIENT)
Age: 63
End: 2023-08-06

## 2023-08-21 ENCOUNTER — LAB REQUISITION (OUTPATIENT)
Dept: LAB | Facility: CLINIC | Age: 63
End: 2023-08-21
Payer: COMMERCIAL

## 2023-08-21 ENCOUNTER — TRANSFERRED RECORDS (OUTPATIENT)
Dept: HEALTH INFORMATION MANAGEMENT | Facility: CLINIC | Age: 63
End: 2023-08-21

## 2023-08-21 DIAGNOSIS — E11.65 TYPE 2 DIABETES MELLITUS WITH HYPERGLYCEMIA (H): ICD-10-CM

## 2023-08-21 LAB
ALBUMIN SERPL BCG-MCNC: 4.4 G/DL (ref 3.5–5.2)
ALP SERPL-CCNC: 120 U/L (ref 40–129)
ALT SERPL W P-5'-P-CCNC: 41 U/L (ref 0–70)
ANION GAP SERPL CALCULATED.3IONS-SCNC: 11 MMOL/L (ref 7–15)
AST SERPL W P-5'-P-CCNC: 23 U/L (ref 0–45)
BILIRUB SERPL-MCNC: 0.5 MG/DL
BUN SERPL-MCNC: 15.2 MG/DL (ref 8–23)
CALCIUM SERPL-MCNC: 10.4 MG/DL (ref 8.8–10.2)
CHLORIDE SERPL-SCNC: 105 MMOL/L (ref 98–107)
CREAT SERPL-MCNC: 1.18 MG/DL (ref 0.67–1.17)
DEPRECATED HCO3 PLAS-SCNC: 27 MMOL/L (ref 22–29)
GFR SERPL CREATININE-BSD FRML MDRD: 70 ML/MIN/1.73M2
GLUCOSE SERPL-MCNC: 126 MG/DL (ref 70–99)
POTASSIUM SERPL-SCNC: 4.6 MMOL/L (ref 3.4–5.3)
PROT SERPL-MCNC: 6.6 G/DL (ref 6.4–8.3)
SODIUM SERPL-SCNC: 143 MMOL/L (ref 136–145)

## 2023-08-21 PROCEDURE — 80053 COMPREHEN METABOLIC PANEL: CPT | Mod: ORL | Performed by: FAMILY MEDICINE

## 2023-10-15 ENCOUNTER — HEALTH MAINTENANCE LETTER (OUTPATIENT)
Age: 63
End: 2023-10-15

## 2023-12-06 DIAGNOSIS — I25.10 CORONARY ARTERY DISEASE DUE TO LIPID RICH PLAQUE: ICD-10-CM

## 2023-12-06 DIAGNOSIS — I25.83 CORONARY ARTERY DISEASE DUE TO LIPID RICH PLAQUE: ICD-10-CM

## 2023-12-07 RX ORDER — CLOPIDOGREL BISULFATE 75 MG/1
TABLET ORAL
Qty: 30 TABLET | Refills: 0 | Status: SHIPPED | OUTPATIENT
Start: 2023-12-07 | End: 2024-01-04

## 2023-12-28 ENCOUNTER — OFFICE VISIT (OUTPATIENT)
Dept: CARDIOLOGY | Facility: CLINIC | Age: 63
End: 2023-12-28
Attending: INTERNAL MEDICINE
Payer: COMMERCIAL

## 2023-12-28 VITALS
WEIGHT: 200 LBS | HEART RATE: 63 BPM | BODY MASS INDEX: 27.89 KG/M2 | SYSTOLIC BLOOD PRESSURE: 110 MMHG | DIASTOLIC BLOOD PRESSURE: 64 MMHG | RESPIRATION RATE: 16 BRPM

## 2023-12-28 DIAGNOSIS — I25.83 CORONARY ARTERY DISEASE DUE TO LIPID RICH PLAQUE: ICD-10-CM

## 2023-12-28 DIAGNOSIS — I25.10 CORONARY ARTERY DISEASE DUE TO LIPID RICH PLAQUE: ICD-10-CM

## 2023-12-28 DIAGNOSIS — I10 ESSENTIAL HYPERTENSION: ICD-10-CM

## 2023-12-28 DIAGNOSIS — G47.33 OSA (OBSTRUCTIVE SLEEP APNEA): Primary | ICD-10-CM

## 2023-12-28 DIAGNOSIS — E11.00 TYPE 2 DIABETES MELLITUS WITH HYPEROSMOLARITY WITHOUT COMA, WITHOUT LONG-TERM CURRENT USE OF INSULIN (H): ICD-10-CM

## 2023-12-28 DIAGNOSIS — E78.00 PURE HYPERCHOLESTEROLEMIA: ICD-10-CM

## 2023-12-28 PROCEDURE — 99214 OFFICE O/P EST MOD 30 MIN: CPT | Performed by: STUDENT IN AN ORGANIZED HEALTH CARE EDUCATION/TRAINING PROGRAM

## 2023-12-28 RX ORDER — METOPROLOL SUCCINATE 25 MG/1
25 TABLET, EXTENDED RELEASE ORAL DAILY
Qty: 90 TABLET | Refills: 3 | Status: SHIPPED | OUTPATIENT
Start: 2023-12-28

## 2023-12-28 NOTE — PROGRESS NOTES
HEART CARE ENCOUNTER NOTE      [facility]  [phone]      Assessment/Recommendations   Assessment:   Coronary artery disease: S/p Angiogram 4/8/2021 showing proximal LAD with 90% lesion that underwent YANE.  Also showed 70% stenosis of first diagonal which was too small to receive intervention.  Patient has had a previous intervention with RCA with stent so will be on Plavix 75 mg daily indefinitely.  Denies any anginal symptoms or exertional shortness of breath.  Essential hypertension: Well-controlled on lisinopril 5 mg daily, metoprolol 25 mg twice daily  Hypercholesterolemia: Well-controlled atorvastatin 80 mg daily.  Last lipids 4/13/2023 showed LDL of 41.  Diabetes mellitus type 2: Non-insulin-dependent. Notes some neuropathy. On metformin, glipizide, dapagliflozin, Trulicity, Mounjaro  Obstructive sleep apnea: Compliant on CPAP.    Plan:   Will switch from metoprolol to tartrate 25 mg twice daily to metoprolol succinate 25 mg daily to see if this can improve patient's exercise tolerance  Patient will monitor his blood pressure and let me know if readings are looking like in 2 weeks to see if we can increase lisinopril to 10 mg daily  Follow-up with Dr. Nails in 1 year, sooner if needed        The level of medical decision making during this visit was of moderate complexity.    Follow up in 1 year with Dr. Nails.     History of Present Illness/Subjective    HPI: Rufus Art is a 63 year old male with PMHx of coronary artery disease, hypertension, hyperlipidemia, obstructive sleep apnea on CPAP, diabetes mellitus type 2 non-insulin-dependent presents for follow-up.    Patient notes over the past 2 years he is lost 80 pounds.  Notes that his combination of changing his eating and workout habits as well as Mounjaro addition a year ago.  Patient denies any chest pain or exertional shortness of breath.  Notes walking, treadmill, weights and kettle bell for his activity.  Feels good  doing these.  Does feel that his exercise is limited due to his metoprolol.  He denies fatigue, lightheadedness, shortness of breath, dyspnea on exertion, orthopnea, PND, palpitations, chest pain, abdominal fullness/bloating, and lower extremity edema.        Nuclear stress test 6/8/2021:    The nuclear stress test is negative for inducible myocardial ischemia or infarction.    The left ventricular ejection fraction at stress is 58%.    The patient is at a low risk of future cardiac ischemic events.    There is no prior study for comparison.    Coronary angiogram 4/8/2021:    Angina in a morbidly obese diabetic smoker with previous RCA PCI.    Normal left main    Severe proximal LAD disease, stented with a Synergy YANE with good angiographic results. The first diagonal is a branching vessel with the more distal branch having a severe proximal stenosis. Due to vessel overlap and inability to get sufficient   camera angles in the mobile cath lab the size of the vessel distal to the stenosis could not be clearly seen, but I suspect it is a small branch.    Moderate OM-3 disease.    Mild RCA instent restenosis and moderate PDA disease.    Estimated blood loss was <20 ml.       Physical Examination  Review of Systems   Vitals: /64 (BP Location: Left arm, Patient Position: Sitting, Cuff Size: Adult Large)   Pulse 63   Resp 16   Wt 90.7 kg (200 lb)   BMI 27.89 kg/m    BMI= There is no height or weight on file to calculate BMI.  Wt Readings from Last 3 Encounters:   04/01/22 116.1 kg (256 lb)   05/19/21 109.8 kg (242 lb)   04/20/21 111.1 kg (245 lb)           ENT/Mouth: membranes moist, no oral lesions or bleeding gums.      EYES:  no scleral icterus, normal conjunctivae                    Neck: No carotid bruit or thyromegaly   Chest/Lungs:   lungs are clear to auscultation, no rales or wheezing,  equal chest wall expansion    Cardiovascular:   Regular. Normal first and second heart sounds with no murmurs, rubs,  or gallops; the carotid, radial pulses are intact, Jugular venous pressure normal, no edema bilaterally        Extremities: no cyanosis or clubbing   Skin: no xanthelasma, warm.    Neurologic: no tremors     Psychiatric: alert and oriented x3, calm        Please refer above for cardiac ROS details.        Medical History  Surgical History Family History Social History   Past Medical History:   Diagnosis Date    CAD (coronary artery disease)     Chronic kidney disease     Diabetes mellitus (H)     Diabetes mellitus (H)     HTN (hypertension)     Hypercholesterolemia     Hypertension     Obesity     SARA (obstructive sleep apnea)     Tobacco abuse      Past Surgical History:   Procedure Laterality Date    ANGIOPLASTY  2007    CV CORONARY ANGIOGRAM N/A 4/8/2021    Procedure: Coronary Angiogram;  Surgeon: Carolyn Harvey MD;  Location: [facility];  Service: Cardiology    CV LEFT HEART CATHETERIZATION WITHOUT LEFT VENTRICULOGRAM Left 4/8/2021    Procedure: Left Heart Catheterization Without Left Ventriculogram;  Surgeon: Carolyn Harvey MD;  Location: [facility] Card[facility] Cath Lab;  Service: Cardiology    VASCULAR SURGERY  cardiac stent      No family history on file.     Social History     Socioeconomic History    Marital status:      Spouse name: Not on file    Number of children: Not on file    Years of education: Not on file    Highest education level: Not on file   Occupational History    Not on file   Tobacco Use    Smoking status: Every Day     Types: Cigarettes    Smokeless tobacco: Never   Substance and Sexual Activity    Alcohol use: Yes     Comment: Alcoholic Drinks/day: pt reports rare use    Drug use: Never    Sexual activity: Yes   Other Topics Concern    Not on file   Social History Narrative    Not on file     Social Determinants of Health     Financial Resource Strain: Not on file   Food Insecurity: Not on file   Transportation Needs: Not on file   Physical Activity: Not on file    Stress: Not on file   Social Connections: Not on file   Interpersonal Safety: Not on file   Housing Stability: Not on file           Medications  Allergies   Current Outpatient Medications   Medication Sig Dispense Refill    acetaminophen (TYLENOL) 500 MG tablet Take 500-1,000 mg by mouth every 6 hours as needed.      aspirin 81 MG EC tablet Take 81 mg by mouth daily.      atorvastatin (LIPITOR) 80 MG tablet Take 80 mg by mouth daily      buPROPion (WELLBUTRIN XL) 150 MG 24 hr tablet Take 150 mg by mouth daily      Citalopram Hydrobromide (CELEXA PO) Take 20 mg by mouth daily.      clopidogrel (PLAVIX) 75 MG tablet TAKE 1 TABLET(75 MG) BY MOUTH DAILY. FOLLOW-UP WITH DOCTOR BORIS** 30 tablet 0    dapagliflozin (FARXIGA) 10 MG TABS tablet Take 1 tablet by mouth daily      DIAZEPAM PO Take 5-10 mg by mouth daily as needed.      dulaglutide (TRULICITY) 1.5 MG/0.5ML pen Inject 1.5 mg Subcutaneous once a week      gabapentin (NEURONTIN) 300 MG capsule Take 900 mg by mouth 3 times daily as needed      GLIPIZIDE PO Take 5 mg by mouth every morning (before breakfast).      LISINOPRIL PO Take 5 mg by mouth daily.      METFORMIN HCL PO Take 1,000 mg by mouth 2 times daily (with meals).      METOPROLOL TARTRATE PO Take 25 mg by mouth 2 times daily.      nitroglycerin (NITROSTAT) 0.4 MG SL tablet Place 0.4 mg under the tongue every 5 minutes as needed.      prasugrel (EFFIENT) 10 MG TABS tablet Take 1 tablet (10 mg) by mouth daily 90 tablet 1       Allergies   Allergen Reactions    Brilinta [Ticagrelor] Shortness Of Breath          Lab Results    Chemistry/lipid CBC Cardiac Enzymes/BNP/TSH/INR   Recent Labs   Lab Test 04/13/23  0848   CHOL 93   HDL 35*   LDL 41   TRIG 87     Recent Labs   Lab Test 04/13/23  0848 09/19/22  0815 01/28/22  0926   LDL 41 18 43     Recent Labs   Lab Test 08/21/23  1607      POTASSIUM 4.6   CHLORIDE 105   CO2 27   *   BUN 15.2   CR 1.18*   GFRESTIMATED 70   JAZMÍN 10.4*     Recent Labs  "  Lab Test 08/21/23  1607 04/13/23  0848 01/09/23  0826   CR 1.18* 1.39* 1.33*     Recent Labs   Lab Test 01/09/23  0826   A1C 6.5*          Recent Labs   Lab Test 04/08/21  0747   WBC 7.9   HGB 16.3   HCT 49.9   MCV 95        Recent Labs   Lab Test 04/08/21  0747   HGB 16.3    No results for input(s): \"TROPONINI\" in the last 86458 hours.  No results for input(s): \"BNP\", \"NTBNPI\", \"NTBNP\" in the last 19760 hours.  No results for input(s): \"TSH\" in the last 15697 hours.  No results for input(s): \"INR\" in the last 61484 hours.     Shraddha Cuevas PA-C                                       "

## 2023-12-28 NOTE — LETTER
12/28/2023    Momo Unger MD  [address]  [facility] [address]    RE: Rufus Art       Dear Colleague,     I had the pleasure of seeing Rufus Art in the [facility].    HEART CARE ENCOUNTER NOTE      [facility]  [phone]      Assessment/Recommendations   Assessment:   Coronary artery disease: S/p Angiogram 4/8/2021 showing proximal LAD with 90% lesion that underwent YANE.  Also showed 70% stenosis of first diagonal which was too small to receive intervention.  Patient has had a previous intervention with RCA with stent so will be on Plavix 75 mg daily indefinitely.  Denies any anginal symptoms or exertional shortness of breath.  Essential hypertension: Well-controlled on lisinopril 5 mg daily, metoprolol 25 mg twice daily  Hypercholesterolemia: Well-controlled atorvastatin 80 mg daily.  Last lipids 4/13/2023 showed LDL of 41.  Diabetes mellitus type 2: Non-insulin-dependent. Notes some neuropathy. On metformin, glipizide, dapagliflozin, Trulicity, Mounjaro  Obstructive sleep apnea: Compliant on CPAP.    Plan:   Will switch from metoprolol to tartrate 25 mg twice daily to metoprolol succinate 25 mg daily to see if this can improve patient's exercise tolerance  Patient will monitor his blood pressure and let me know if readings are looking like in 2 weeks to see if we can increase lisinopril to 10 mg daily  Follow-up with Dr. Nails in 1 year, sooner if needed        The level of medical decision making during this visit was of moderate complexity.    Follow up in 1 year with Dr. Nails.     History of Present Illness/Subjective    HPI: Rufus Art is a 63 year old male with PMHx of coronary artery disease, hypertension, hyperlipidemia, obstructive sleep apnea on CPAP, diabetes mellitus type 2 non-insulin-dependent presents for follow-up.    Patient notes over the past 2 years he is lost 80 pounds.  Notes that his combination of changing his eating and  workout habits as well as Mounjaro addition a year ago.  Patient denies any chest pain or exertional shortness of breath.  Notes walking, treadmill, weights and kettle bell for his activity.  Feels good doing these.  Does feel that his exercise is limited due to his metoprolol.  He denies fatigue, lightheadedness, shortness of breath, dyspnea on exertion, orthopnea, PND, palpitations, chest pain, abdominal fullness/bloating, and lower extremity edema.        Nuclear stress test 6/8/2021:    The nuclear stress test is negative for inducible myocardial ischemia or infarction.    The left ventricular ejection fraction at stress is 58%.    The patient is at a low risk of future cardiac ischemic events.    There is no prior study for comparison.    Coronary angiogram 4/8/2021:    Angina in a morbidly obese diabetic smoker with previous RCA PCI.    Normal left main    Severe proximal LAD disease, stented with a Synergy YANE with good angiographic results. The first diagonal is a branching vessel with the more distal branch having a severe proximal stenosis. Due to vessel overlap and inability to get sufficient   camera angles in the mobile cath lab the size of the vessel distal to the stenosis could not be clearly seen, but I suspect it is a small branch.    Moderate OM-3 disease.    Mild RCA instent restenosis and moderate PDA disease.    Estimated blood loss was <20 ml.       Physical Examination  Review of Systems   Vitals: /64 (BP Location: Left arm, Patient Position: Sitting, Cuff Size: Adult Large)   Pulse 63   Resp 16   Wt 90.7 kg (200 lb)   BMI 27.89 kg/m    BMI= There is no height or weight on file to calculate BMI.  Wt Readings from Last 3 Encounters:   04/01/22 116.1 kg (256 lb)   05/19/21 109.8 kg (242 lb)   04/20/21 111.1 kg (245 lb)           ENT/Mouth: membranes moist, no oral lesions or bleeding gums.      EYES:  no scleral icterus, normal conjunctivae                    Neck: No carotid bruit or  thyromegaly   Chest/Lungs:   lungs are clear to auscultation, no rales or wheezing,  equal chest wall expansion    Cardiovascular:   Regular. Normal first and second heart sounds with no murmurs, rubs, or gallops; the carotid, radial pulses are intact, Jugular venous pressure normal, no edema bilaterally        Extremities: no cyanosis or clubbing   Skin: no xanthelasma, warm.    Neurologic: no tremors     Psychiatric: alert and oriented x3, calm        Please refer above for cardiac ROS details.        Medical History  Surgical History Family History Social History   Past Medical History:   Diagnosis Date    CAD (coronary artery disease)     Chronic kidney disease     Diabetes mellitus (H)     Diabetes mellitus (H)     HTN (hypertension)     Hypercholesterolemia     Hypertension     Obesity     SARA (obstructive sleep apnea)     Tobacco abuse      Past Surgical History:   Procedure Laterality Date    ANGIOPLASTY  2007    CV CORONARY ANGIOGRAM N/A 4/8/2021    Procedure: Coronary Angiogram;  Surgeon: Carolyn Harvey MD;  Location: [facility] Cardiac [facility] Lab;  Service: Cardiology    CV LEFT HEART CATHETERIZATION WITHOUT LEFT VENTRICULOGRAM Left 4/8/2021    Procedure: Left Heart Catheterization Without Left Ventriculogram;  Surgeon: Carolyn Harvey MD;  Location: [facility] Card[facility] Lab;  Service: Cardiology    VASCULAR SURGERY  cardiac stent      No family history on file.     Social History     Socioeconomic History    Marital status:      Spouse name: Not on file    Number of children: Not on file    Years of education: Not on file    Highest education level: Not on file   Occupational History    Not on file   Tobacco Use    Smoking status: Every Day     Types: Cigarettes    Smokeless tobacco: Never   Substance and Sexual Activity    Alcohol use: Yes     Comment: Alcoholic Drinks/day: pt reports rare use    Drug use: Never    Sexual activity: Yes   Other Topics Concern    Not on file   Social History  Narrative    Not on file     Social Determinants of Health     Financial Resource Strain: Not on file   Food Insecurity: Not on file   Transportation Needs: Not on file   Physical Activity: Not on file   Stress: Not on file   Social Connections: Not on file   Interpersonal Safety: Not on file   Housing Stability: Not on file           Medications  Allergies   Current Outpatient Medications   Medication Sig Dispense Refill    acetaminophen (TYLENOL) 500 MG tablet Take 500-1,000 mg by mouth every 6 hours as needed.      aspirin 81 MG EC tablet Take 81 mg by mouth daily.      atorvastatin (LIPITOR) 80 MG tablet Take 80 mg by mouth daily      buPROPion (WELLBUTRIN XL) 150 MG 24 hr tablet Take 150 mg by mouth daily      Citalopram Hydrobromide (CELEXA PO) Take 20 mg by mouth daily.      clopidogrel (PLAVIX) 75 MG tablet TAKE 1 TABLET(75 MG) BY MOUTH DAILY. FOLLOW-UP WITH DOCTOR BORIS** 30 tablet 0    dapagliflozin (FARXIGA) 10 MG TABS tablet Take 1 tablet by mouth daily      DIAZEPAM PO Take 5-10 mg by mouth daily as needed.      dulaglutide (TRULICITY) 1.5 MG/0.5ML pen Inject 1.5 mg Subcutaneous once a week      gabapentin (NEURONTIN) 300 MG capsule Take 900 mg by mouth 3 times daily as needed      GLIPIZIDE PO Take 5 mg by mouth every morning (before breakfast).      LISINOPRIL PO Take 5 mg by mouth daily.      METFORMIN HCL PO Take 1,000 mg by mouth 2 times daily (with meals).      METOPROLOL TARTRATE PO Take 25 mg by mouth 2 times daily.      nitroglycerin (NITROSTAT) 0.4 MG SL tablet Place 0.4 mg under the tongue every 5 minutes as needed.      prasugrel (EFFIENT) 10 MG TABS tablet Take 1 tablet (10 mg) by mouth daily 90 tablet 1       Allergies   Allergen Reactions    Brilinta [Ticagrelor] Shortness Of Breath          Lab Results    Chemistry/lipid CBC Cardiac Enzymes/BNP/TSH/INR   Recent Labs   Lab Test 04/13/23  0848   CHOL 93   HDL 35*   LDL 41   TRIG 87     Recent Labs   Lab Test 04/13/23  0848 09/19/22  0815  "01/28/22  0926   LDL 41 18 43     Recent Labs   Lab Test 08/21/23  1607      POTASSIUM 4.6   CHLORIDE 105   CO2 27   *   BUN 15.2   CR 1.18*   GFRESTIMATED 70   JAZMÍN 10.4*     Recent Labs   Lab Test 08/21/23  1607 04/13/23  0848 01/09/23  0826   CR 1.18* 1.39* 1.33*     Recent Labs   Lab Test 01/09/23  0826   A1C 6.5*          Recent Labs   Lab Test 04/08/21  0747   WBC 7.9   HGB 16.3   HCT 49.9   MCV 95        Recent Labs   Lab Test 04/08/21  0747   HGB 16.3    No results for input(s): \"TROPONINI\" in the last 06789 hours.  No results for input(s): \"BNP\", \"NTBNPI\", \"NTBNP\" in the last 89052 hours.  No results for input(s): \"TSH\" in the last 55189 hours.  No results for input(s): \"INR\" in the last 49731 hours.     Shraddha Cuevas PA-C              Thank you for allowing me to participate in the care of your patient.      Sincerely,     Shraddha Cuevas PA-C     [facility]  cc:   Kory Nails MD  [address], [address]  [place] [address]      "

## 2023-12-28 NOTE — PATIENT INSTRUCTIONS
Rufus Art,    It was a pleasure to see you today at the [facility].     My recommendations after this visit include:    - Switch from metoprolol tartrate 25 mg twice daily to metoprolol succinate 25 mg daily to see if patient can improve his exercise.   - Monitor BP and can see if we can increase lisinopril to 10 mg daily.   - Follow up with Dr. Nails in 1 year, sooner if needed    - Please call [phone], if you have any questions or concerns      Shraddha Cuevas PA-C

## 2024-01-04 DIAGNOSIS — I25.10 CORONARY ARTERY DISEASE DUE TO LIPID RICH PLAQUE: ICD-10-CM

## 2024-01-04 DIAGNOSIS — I25.83 CORONARY ARTERY DISEASE DUE TO LIPID RICH PLAQUE: ICD-10-CM

## 2024-01-04 RX ORDER — CLOPIDOGREL BISULFATE 75 MG/1
75 TABLET ORAL DAILY
Qty: 90 TABLET | Refills: 3 | Status: SHIPPED | OUTPATIENT
Start: 2024-01-04 | End: 2024-09-30

## 2024-02-22 ENCOUNTER — LAB REQUISITION (OUTPATIENT)
Dept: LAB | Facility: CLINIC | Age: 64
End: 2024-02-22

## 2024-02-22 DIAGNOSIS — Z12.5 ENCOUNTER FOR SCREENING FOR MALIGNANT NEOPLASM OF PROSTATE: ICD-10-CM

## 2024-02-22 LAB
ALBUMIN SERPL BCG-MCNC: 4.1 G/DL (ref 3.5–5.2)
ALP SERPL-CCNC: 120 U/L (ref 40–150)
ALT SERPL W P-5'-P-CCNC: 42 U/L (ref 0–70)
ANION GAP SERPL CALCULATED.3IONS-SCNC: 10 MMOL/L (ref 7–15)
AST SERPL W P-5'-P-CCNC: 23 U/L (ref 0–45)
BILIRUB SERPL-MCNC: 0.5 MG/DL
BUN SERPL-MCNC: 15.4 MG/DL (ref 8–23)
CALCIUM SERPL-MCNC: 9.5 MG/DL (ref 8.8–10.2)
CHLORIDE SERPL-SCNC: 106 MMOL/L (ref 98–107)
CHOLEST SERPL-MCNC: 96 MG/DL
CREAT SERPL-MCNC: 1.3 MG/DL (ref 0.67–1.17)
DEPRECATED HCO3 PLAS-SCNC: 26 MMOL/L (ref 22–29)
EGFRCR SERPLBLD CKD-EPI 2021: 62 ML/MIN/1.73M2
FASTING STATUS PATIENT QL REPORTED: NORMAL
GLUCOSE SERPL-MCNC: 127 MG/DL (ref 70–99)
HDLC SERPL-MCNC: 49 MG/DL
LDLC SERPL CALC-MCNC: 37 MG/DL
NONHDLC SERPL-MCNC: 47 MG/DL
POTASSIUM SERPL-SCNC: 4.2 MMOL/L (ref 3.4–5.3)
PROT SERPL-MCNC: 6.4 G/DL (ref 6.4–8.3)
PSA SERPL DL<=0.01 NG/ML-MCNC: 0.74 NG/ML (ref 0–4.5)
SODIUM SERPL-SCNC: 142 MMOL/L (ref 135–145)
TRIGL SERPL-MCNC: 50 MG/DL

## 2024-02-22 PROCEDURE — 80053 COMPREHEN METABOLIC PANEL: CPT | Performed by: FAMILY MEDICINE

## 2024-02-22 PROCEDURE — G0103 PSA SCREENING: HCPCS | Performed by: FAMILY MEDICINE

## 2024-02-22 PROCEDURE — 80061 LIPID PANEL: CPT | Performed by: FAMILY MEDICINE

## 2024-03-03 ENCOUNTER — HEALTH MAINTENANCE LETTER (OUTPATIENT)
Age: 64
End: 2024-03-03

## 2024-07-21 ENCOUNTER — HEALTH MAINTENANCE LETTER (OUTPATIENT)
Age: 64
End: 2024-07-21

## 2024-08-22 ENCOUNTER — LAB REQUISITION (OUTPATIENT)
Dept: LAB | Facility: CLINIC | Age: 64
End: 2024-08-22

## 2024-08-22 DIAGNOSIS — I12.9 HYPERTENSIVE CHRONIC KIDNEY DISEASE WITH STAGE 1 THROUGH STAGE 4 CHRONIC KIDNEY DISEASE, OR UNSPECIFIED CHRONIC KIDNEY DISEASE: ICD-10-CM

## 2024-08-22 DIAGNOSIS — E78.5 HYPERLIPIDEMIA, UNSPECIFIED: ICD-10-CM

## 2024-08-22 LAB
ALBUMIN SERPL BCG-MCNC: 4 G/DL (ref 3.5–5.2)
ALP SERPL-CCNC: 127 U/L (ref 40–150)
ALT SERPL W P-5'-P-CCNC: 55 U/L (ref 0–70)
ANION GAP SERPL CALCULATED.3IONS-SCNC: 12 MMOL/L (ref 7–15)
AST SERPL W P-5'-P-CCNC: 28 U/L (ref 0–45)
BILIRUB SERPL-MCNC: 0.3 MG/DL
BUN SERPL-MCNC: 18.5 MG/DL (ref 8–23)
CALCIUM SERPL-MCNC: 9.5 MG/DL (ref 8.8–10.4)
CHLORIDE SERPL-SCNC: 109 MMOL/L (ref 98–107)
CHOLEST SERPL-MCNC: 99 MG/DL
CREAT SERPL-MCNC: 1.35 MG/DL (ref 0.67–1.17)
EGFRCR SERPLBLD CKD-EPI 2021: 59 ML/MIN/1.73M2
FASTING STATUS PATIENT QL REPORTED: ABNORMAL
FASTING STATUS PATIENT QL REPORTED: NORMAL
GLUCOSE SERPL-MCNC: 144 MG/DL (ref 70–99)
HCO3 SERPL-SCNC: 22 MMOL/L (ref 22–29)
HDLC SERPL-MCNC: 43 MG/DL
LDLC SERPL CALC-MCNC: 38 MG/DL
NONHDLC SERPL-MCNC: 56 MG/DL
POTASSIUM SERPL-SCNC: 4 MMOL/L (ref 3.4–5.3)
PROT SERPL-MCNC: 6.5 G/DL (ref 6.4–8.3)
SODIUM SERPL-SCNC: 143 MMOL/L (ref 135–145)
TRIGL SERPL-MCNC: 92 MG/DL

## 2024-08-22 PROCEDURE — 82040 ASSAY OF SERUM ALBUMIN: CPT | Performed by: FAMILY MEDICINE

## 2024-08-22 PROCEDURE — 80061 LIPID PANEL: CPT | Performed by: FAMILY MEDICINE

## 2024-09-29 ENCOUNTER — HEALTH MAINTENANCE LETTER (OUTPATIENT)
Age: 64
End: 2024-09-29

## 2024-09-30 DIAGNOSIS — I25.10 CORONARY ARTERY DISEASE DUE TO LIPID RICH PLAQUE: ICD-10-CM

## 2024-09-30 DIAGNOSIS — I25.83 CORONARY ARTERY DISEASE DUE TO LIPID RICH PLAQUE: ICD-10-CM

## 2024-09-30 RX ORDER — CLOPIDOGREL BISULFATE 75 MG/1
75 TABLET ORAL DAILY
Qty: 90 TABLET | Refills: 0 | Status: SHIPPED | OUTPATIENT
Start: 2024-09-30

## 2024-10-02 ENCOUNTER — OFFICE VISIT (OUTPATIENT)
Dept: CARDIOLOGY | Facility: CLINIC | Age: 64
End: 2024-10-02
Payer: COMMERCIAL

## 2024-10-02 VITALS
HEART RATE: 66 BPM | DIASTOLIC BLOOD PRESSURE: 56 MMHG | WEIGHT: 225 LBS | SYSTOLIC BLOOD PRESSURE: 108 MMHG | HEIGHT: 71 IN | RESPIRATION RATE: 16 BRPM | BODY MASS INDEX: 31.5 KG/M2

## 2024-10-02 DIAGNOSIS — G47.33 OSA (OBSTRUCTIVE SLEEP APNEA): ICD-10-CM

## 2024-10-02 DIAGNOSIS — I25.83 CORONARY ARTERY DISEASE DUE TO LIPID RICH PLAQUE: Primary | ICD-10-CM

## 2024-10-02 DIAGNOSIS — E11.00 TYPE 2 DIABETES MELLITUS WITH HYPEROSMOLARITY WITHOUT COMA, WITHOUT LONG-TERM CURRENT USE OF INSULIN (H): ICD-10-CM

## 2024-10-02 DIAGNOSIS — Z72.0 TOBACCO ABUSE DISORDER: ICD-10-CM

## 2024-10-02 DIAGNOSIS — E66.811 CLASS 1 OBESITY DUE TO EXCESS CALORIES WITH SERIOUS COMORBIDITY AND BODY MASS INDEX (BMI) OF 31.0 TO 31.9 IN ADULT: ICD-10-CM

## 2024-10-02 DIAGNOSIS — E66.09 CLASS 1 OBESITY DUE TO EXCESS CALORIES WITH SERIOUS COMORBIDITY AND BODY MASS INDEX (BMI) OF 31.0 TO 31.9 IN ADULT: ICD-10-CM

## 2024-10-02 DIAGNOSIS — E78.00 PURE HYPERCHOLESTEROLEMIA: ICD-10-CM

## 2024-10-02 DIAGNOSIS — I25.10 CORONARY ARTERY DISEASE DUE TO LIPID RICH PLAQUE: Primary | ICD-10-CM

## 2024-10-02 DIAGNOSIS — I10 ESSENTIAL HYPERTENSION: ICD-10-CM

## 2024-10-02 PROCEDURE — G2211 COMPLEX E/M VISIT ADD ON: HCPCS | Performed by: INTERNAL MEDICINE

## 2024-10-02 PROCEDURE — 99214 OFFICE O/P EST MOD 30 MIN: CPT | Performed by: INTERNAL MEDICINE

## 2024-10-02 RX ORDER — SEMAGLUTIDE 1.34 MG/ML
INJECTION, SOLUTION SUBCUTANEOUS
COMMUNITY
Start: 2024-08-03

## 2024-10-02 NOTE — PATIENT INSTRUCTIONS
Mr Rufus Art,  I enjoyed visiting with you again today.  I am glad to hear you are doing well.  Per our conversation keep up the same meds and work on weight loss.  I will plan on seeing you 1-2 years.  Kory Nails

## 2024-10-02 NOTE — LETTER
10/2/2024    Momo Unger MD  [address]  [facility] [address]    RE: Rufus VEGA Rubens       Dear Colleague,     I had the pleasure of seeing Rufus VEGA Rubens in the [facility].      [facility]  Heart Care Clinic Follow-up Note    Assessment & Plan      (I25.10,  I25.83) Coronary artery disease due to lipid rich plaque  (primary encounter diagnosis)  Comment: Angiography due to symptoms April 8, 2021 showed normal left main, proximal LAD 90% lesion that received a drug-coated stent, first diagonal 70% lesion, too small to receive intervention. Circumflex was normal with third obtuse marginal artery sequential 60 and 40% lesions, right coronary had a proximal 20% lesion with a distal PDA 50% stenosis.  No significant symptoms, this is his second episode of intervention, and given this Plavix 75 mg a day indefinitely.     (I10) Essential hypertension  Comment: Under good control on metoprolol and lisinopril.       (E78.00) Pure hypercholesterolemia  Comment: Total cholesterol 99 with an LDL of 38 which is excellent and continue current statin.     (E11.00) Type 2 diabetes mellitus with hyperosmolarity without coma, without long-term current use of insulin (H)  Comment: Hemoglobin A1c 6.2, currently on Jardiance, metformin, and Ozempic.      (E66.01) Class 2 severe obesity due to excess calories with serious comorbidity in adult, unspecified BMI (H)  Comment: Work on weight loss.     (G47.33) SARA (obstructive sleep apnea)  Comment: Nightly CPAP.     (Z72.0) Tobacco abuse disorder  Comment: He has stopped smoking.    Plan  1.  Continue current meds including chronic Plavix.  2.  Work on weight loss.  3.  Follow-up me in about 18 months or sooner if needed.    The longitudinal plan of care for the diagnosis(es)/condition(s) as documented were addressed during this visit. Due to the added complexity in care, I will continue to support Rufus in the subsequent management and with ongoing  "continuity of care.     Subjective  CC: 64-year-old white gentleman here for yearly follow-up, still living independently with his wife, getting ready to retire within a month.  No syncope, presyncope, fatigue, chest pain, palpitations, shortness of breath, PND, without her peripheral edema.    Medications  Current Outpatient Medications   Medication Sig Dispense Refill     acetaminophen (TYLENOL) 500 MG tablet Take 500-1,000 mg by mouth every 6 hours as needed.       atorvastatin (LIPITOR) 80 MG tablet Take 80 mg by mouth daily       buPROPion (WELLBUTRIN XL) 150 MG 24 hr tablet Take 150 mg by mouth daily       Citalopram Hydrobromide (CELEXA PO) Take 20 mg by mouth daily.       clopidogrel (PLAVIX) 75 MG tablet Take 1 tablet (75 mg) by mouth daily. 90 tablet 0     empagliflozin (JARDIANCE) 10 MG TABS tablet        gabapentin (NEURONTIN) 300 MG capsule Take 900 mg by mouth 3 times daily as needed       LISINOPRIL PO Take 5 mg by mouth daily.       METFORMIN HCL PO Take 1,000 mg by mouth 2 times daily (with meals).       metoprolol succinate ER (TOPROL XL) 25 MG 24 hr tablet Take 1 tablet (25 mg) by mouth daily 90 tablet 3     nitroglycerin (NITROSTAT) 0.4 MG SL tablet Place 0.4 mg under the tongue every 5 minutes as needed.       OZEMPIC, 1 MG/DOSE, 4 MG/3ML pen INJECT 1MG UNDER THE SKIN ONE DAY A WEEK       DIAZEPAM PO Take 5-10 mg by mouth daily as needed. (Patient not taking: Reported on 10/2/2024)         Objective  /56 (BP Location: Left arm, Patient Position: Sitting, Cuff Size: Adult Large)   Pulse 66   Resp 16   Ht 1.803 m (5' 11\")   Wt 102.1 kg (225 lb)   BMI 31.38 kg/m      General Appearance:    Alert, cooperative, no distress, appears stated age   Head:    Normocephalic, without obvious abnormality, atraumatic   Throat:   Lips, mucosa, and tongue normal; teeth and gums normal   Neck:   Supple, symmetrical, trachea midline, no adenopathy;        thyroid:  No enlargement/tenderness/nodules; no " "carotid    bruit or JVD   Back:     Symmetric, no curvature, ROM normal, no CVA tenderness   Lungs:     Clear to auscultation bilaterally, respirations unlabored   Chest wall:    No tenderness or deformity   Heart:    Regular rate and rhythm, S1 and S2 normal, no murmur, rub   or gallop   Abdomen:     Soft, non-tender, bowel sounds active all four quadrants,     no masses, no organomegaly   Extremities:   Normal, atraumatic, no cyanosis or edema   Pulses:   2+ and symmetric all extremities   Skin:   Skin color, texture, turgor normal, no rashes or lesions     Results    Lab Results personally reviewed   Lab Results   Component Value Date    CHOL 99 08/22/2024    CHOL 96 02/22/2024     Lab Results   Component Value Date    HDL 43 08/22/2024    HDL 49 02/22/2024     No components found for: \"LDLCALC\"  Lab Results   Component Value Date    TRIG 92 08/22/2024    TRIG 50 02/22/2024     Lab Results   Component Value Date    WBC 7.9 04/08/2021    HGB 16.3 04/08/2021    HCT 49.9 04/08/2021     04/08/2021     Lab Results   Component Value Date    BUN 18.5 08/22/2024     08/22/2024    CO2 22 08/22/2024             Thank you for allowing me to participate in the care of your patient.      Sincerely,     LATANYA NAILS MD     [facility]  cc:   Susu Nails MD  [address], [address]  [place] [address]      "

## 2024-10-02 NOTE — PROGRESS NOTES
[facility]  [facility] Clinic Follow-up Note    Assessment & Plan      (I25.10,  I25.83) Coronary artery disease due to lipid rich plaque  (primary encounter diagnosis)  Comment: Angiography due to symptoms April 8, 2021 showed normal left main, proximal LAD 90% lesion that received a drug-coated stent, first diagonal 70% lesion, too small to receive intervention. Circumflex was normal with third obtuse marginal artery sequential 60 and 40% lesions, right coronary had a proximal 20% lesion with a distal PDA 50% stenosis.  No significant symptoms, this is his second episode of intervention, and given this Plavix 75 mg a day indefinitely.     (I10) Essential hypertension  Comment: Under good control on metoprolol and lisinopril.       (E78.00) Pure hypercholesterolemia  Comment: Total cholesterol 99 with an LDL of 38 which is excellent and continue current statin.     (E11.00) Type 2 diabetes mellitus with hyperosmolarity without coma, without long-term current use of insulin (H)  Comment: Hemoglobin A1c 6.2, currently on Jardiance, metformin, and Ozempic.      (E66.01) Class 2 severe obesity due to excess calories with serious comorbidity in adult, unspecified BMI (H)  Comment: Work on weight loss.     (G47.33) SARA (obstructive sleep apnea)  Comment: Nightly CPAP.     (Z72.0) Tobacco abuse disorder  Comment: He has stopped smoking.    Plan  1.  Continue current meds including chronic Plavix.  2.  Work on weight loss.  3.  Follow-up me in about 18 months or sooner if needed.    The longitudinal plan of care for the diagnosis(es)/condition(s) as documented were addressed during this visit. Due to the added complexity in care, I will continue to support Rufus in the subsequent management and with ongoing continuity of care.     Subjective  CC: 64-year-old white gentleman here for yearly follow-up, still living independently with his wife, getting ready to retire within a month.  No syncope, presyncope,  "fatigue, chest pain, palpitations, shortness of breath, PND, without her peripheral edema.    Medications  Current Outpatient Medications   Medication Sig Dispense Refill    acetaminophen (TYLENOL) 500 MG tablet Take 500-1,000 mg by mouth every 6 hours as needed.      atorvastatin (LIPITOR) 80 MG tablet Take 80 mg by mouth daily      buPROPion (WELLBUTRIN XL) 150 MG 24 hr tablet Take 150 mg by mouth daily      Citalopram Hydrobromide (CELEXA PO) Take 20 mg by mouth daily.      clopidogrel (PLAVIX) 75 MG tablet Take 1 tablet (75 mg) by mouth daily. 90 tablet 0    empagliflozin (JARDIANCE) 10 MG TABS tablet       gabapentin (NEURONTIN) 300 MG capsule Take 900 mg by mouth 3 times daily as needed      LISINOPRIL PO Take 5 mg by mouth daily.      METFORMIN HCL PO Take 1,000 mg by mouth 2 times daily (with meals).      metoprolol succinate ER (TOPROL XL) 25 MG 24 hr tablet Take 1 tablet (25 mg) by mouth daily 90 tablet 3    nitroglycerin (NITROSTAT) 0.4 MG SL tablet Place 0.4 mg under the tongue every 5 minutes as needed.      OZEMPIC, 1 MG/DOSE, 4 MG/3ML pen INJECT 1MG UNDER THE SKIN ONE DAY A WEEK      DIAZEPAM PO Take 5-10 mg by mouth daily as needed. (Patient not taking: Reported on 10/2/2024)         Objective  /56 (BP Location: Left arm, Patient Position: Sitting, Cuff Size: Adult Large)   Pulse 66   Resp 16   Ht 1.803 m (5' 11\")   Wt 102.1 kg (225 lb)   BMI 31.38 kg/m      General Appearance:    Alert, cooperative, no distress, appears stated age   Head:    Normocephalic, without obvious abnormality, atraumatic   Throat:   Lips, mucosa, and tongue normal; teeth and gums normal   Neck:   Supple, symmetrical, trachea midline, no adenopathy;        thyroid:  No enlargement/tenderness/nodules; no carotid    bruit or JVD   Back:     Symmetric, no curvature, ROM normal, no CVA tenderness   Lungs:     Clear to auscultation bilaterally, respirations unlabored   Chest wall:    No tenderness or deformity   Heart:  " "  Regular rate and rhythm, S1 and S2 normal, no murmur, rub   or gallop   Abdomen:     Soft, non-tender, bowel sounds active all four quadrants,     no masses, no organomegaly   Extremities:   Normal, atraumatic, no cyanosis or edema   Pulses:   2+ and symmetric all extremities   Skin:   Skin color, texture, turgor normal, no rashes or lesions     Results    Lab Results personally reviewed   Lab Results   Component Value Date    CHOL 99 08/22/2024    CHOL 96 02/22/2024     Lab Results   Component Value Date    HDL 43 08/22/2024    HDL 49 02/22/2024     No components found for: \"LDLCALC\"  Lab Results   Component Value Date    TRIG 92 08/22/2024    TRIG 50 02/22/2024     Lab Results   Component Value Date    WBC 7.9 04/08/2021    HGB 16.3 04/08/2021    HCT 49.9 04/08/2021     04/08/2021     Lab Results   Component Value Date    BUN 18.5 08/22/2024     08/22/2024    CO2 22 08/22/2024         "

## 2024-12-08 ENCOUNTER — HEALTH MAINTENANCE LETTER (OUTPATIENT)
Age: 64
End: 2024-12-08

## 2025-02-27 ENCOUNTER — LAB REQUISITION (OUTPATIENT)
Dept: LAB | Facility: CLINIC | Age: 65
End: 2025-02-27

## 2025-02-27 DIAGNOSIS — E11.22 TYPE 2 DIABETES MELLITUS WITH DIABETIC CHRONIC KIDNEY DISEASE (H): ICD-10-CM

## 2025-02-27 DIAGNOSIS — I12.9 HYPERTENSIVE CHRONIC KIDNEY DISEASE WITH STAGE 1 THROUGH STAGE 4 CHRONIC KIDNEY DISEASE, OR UNSPECIFIED CHRONIC KIDNEY DISEASE: ICD-10-CM

## 2025-02-27 DIAGNOSIS — Z12.5 ENCOUNTER FOR SCREENING FOR MALIGNANT NEOPLASM OF PROSTATE: ICD-10-CM

## 2025-02-27 DIAGNOSIS — E78.5 HYPERLIPIDEMIA, UNSPECIFIED: ICD-10-CM

## 2025-02-27 LAB
ALBUMIN SERPL BCG-MCNC: 4.1 G/DL (ref 3.5–5.2)
ALP SERPL-CCNC: 105 U/L (ref 40–150)
ALT SERPL W P-5'-P-CCNC: 35 U/L (ref 0–70)
ANION GAP SERPL CALCULATED.3IONS-SCNC: 9 MMOL/L (ref 7–15)
AST SERPL W P-5'-P-CCNC: 23 U/L (ref 0–45)
BILIRUB SERPL-MCNC: 0.6 MG/DL
BUN SERPL-MCNC: 17.8 MG/DL (ref 8–23)
CALCIUM SERPL-MCNC: 10.1 MG/DL (ref 8.8–10.4)
CHLORIDE SERPL-SCNC: 106 MMOL/L (ref 98–107)
CHOLEST SERPL-MCNC: 84 MG/DL
CREAT SERPL-MCNC: 1.39 MG/DL (ref 0.67–1.17)
CREAT UR-MCNC: 112 MG/DL
EGFRCR SERPLBLD CKD-EPI 2021: 57 ML/MIN/1.73M2
FASTING STATUS PATIENT QL REPORTED: ABNORMAL
FASTING STATUS PATIENT QL REPORTED: ABNORMAL
GLUCOSE SERPL-MCNC: 101 MG/DL (ref 70–99)
HCO3 SERPL-SCNC: 24 MMOL/L (ref 22–29)
HDLC SERPL-MCNC: 31 MG/DL
LDLC SERPL CALC-MCNC: 31 MG/DL
MICROALBUMIN UR-MCNC: 43.9 MG/L
MICROALBUMIN/CREAT UR: 39.2 MG/G CR (ref 0–17)
NONHDLC SERPL-MCNC: 53 MG/DL
POTASSIUM SERPL-SCNC: 4.4 MMOL/L (ref 3.4–5.3)
PROT SERPL-MCNC: 6.6 G/DL (ref 6.4–8.3)
PSA SERPL DL<=0.01 NG/ML-MCNC: 0.66 NG/ML (ref 0–4.5)
SODIUM SERPL-SCNC: 139 MMOL/L (ref 135–145)
TRIGL SERPL-MCNC: 109 MG/DL

## 2025-02-27 PROCEDURE — 80061 LIPID PANEL: CPT | Performed by: FAMILY MEDICINE

## 2025-02-27 PROCEDURE — G0103 PSA SCREENING: HCPCS | Performed by: FAMILY MEDICINE

## 2025-02-27 PROCEDURE — 82043 UR ALBUMIN QUANTITATIVE: CPT | Performed by: FAMILY MEDICINE

## 2025-02-27 PROCEDURE — 82435 ASSAY OF BLOOD CHLORIDE: CPT | Performed by: FAMILY MEDICINE

## 2025-03-15 ENCOUNTER — HEALTH MAINTENANCE LETTER (OUTPATIENT)
Age: 65
End: 2025-03-15

## 2025-06-29 ENCOUNTER — HEALTH MAINTENANCE LETTER (OUTPATIENT)
Age: 65
End: 2025-06-29

## 2025-08-28 ENCOUNTER — LAB REQUISITION (OUTPATIENT)
Dept: LAB | Facility: CLINIC | Age: 65
End: 2025-08-28

## 2025-08-28 DIAGNOSIS — I12.9 HYPERTENSIVE CHRONIC KIDNEY DISEASE WITH STAGE 1 THROUGH STAGE 4 CHRONIC KIDNEY DISEASE, OR UNSPECIFIED CHRONIC KIDNEY DISEASE: ICD-10-CM

## 2025-08-28 DIAGNOSIS — E78.5 HYPERLIPIDEMIA, UNSPECIFIED: ICD-10-CM

## 2025-08-28 LAB
ALBUMIN SERPL BCG-MCNC: 4.1 G/DL (ref 3.5–5.2)
ALP SERPL-CCNC: 106 U/L (ref 40–150)
ALT SERPL W P-5'-P-CCNC: 35 U/L (ref 0–70)
ANION GAP SERPL CALCULATED.3IONS-SCNC: 10 MMOL/L (ref 7–15)
AST SERPL W P-5'-P-CCNC: 22 U/L (ref 0–45)
BILIRUB SERPL-MCNC: 0.5 MG/DL
BUN SERPL-MCNC: 21.8 MG/DL (ref 8–23)
CALCIUM SERPL-MCNC: 9.8 MG/DL (ref 8.8–10.4)
CHLORIDE SERPL-SCNC: 103 MMOL/L (ref 98–107)
CHOLEST SERPL-MCNC: 96 MG/DL
CREAT SERPL-MCNC: 1.33 MG/DL (ref 0.67–1.17)
EGFRCR SERPLBLD CKD-EPI 2021: 60 ML/MIN/1.73M2
FASTING STATUS PATIENT QL REPORTED: ABNORMAL
FASTING STATUS PATIENT QL REPORTED: NORMAL
GLUCOSE SERPL-MCNC: 113 MG/DL (ref 70–99)
HCO3 SERPL-SCNC: 24 MMOL/L (ref 22–29)
HDLC SERPL-MCNC: 41 MG/DL
LDLC SERPL CALC-MCNC: 37 MG/DL
NONHDLC SERPL-MCNC: 55 MG/DL
POTASSIUM SERPL-SCNC: 4.5 MMOL/L (ref 3.4–5.3)
PROT SERPL-MCNC: 6.5 G/DL (ref 6.4–8.3)
SODIUM SERPL-SCNC: 137 MMOL/L (ref 135–145)
TRIGL SERPL-MCNC: 88 MG/DL

## 2025-08-28 PROCEDURE — 82465 ASSAY BLD/SERUM CHOLESTEROL: CPT | Performed by: FAMILY MEDICINE

## 2025-08-28 PROCEDURE — 84155 ASSAY OF PROTEIN SERUM: CPT | Performed by: FAMILY MEDICINE
